# Patient Record
Sex: FEMALE | Race: WHITE | HISPANIC OR LATINO | Employment: PART TIME | URBAN - NONMETROPOLITAN AREA
[De-identification: names, ages, dates, MRNs, and addresses within clinical notes are randomized per-mention and may not be internally consistent; named-entity substitution may affect disease eponyms.]

---

## 2023-10-10 ENCOUNTER — APPOINTMENT (OUTPATIENT)
Dept: PRIMARY CARE | Facility: CLINIC | Age: 28
End: 2023-10-10
Payer: COMMERCIAL

## 2023-10-31 ENCOUNTER — OFFICE VISIT (OUTPATIENT)
Dept: PRIMARY CARE | Facility: CLINIC | Age: 28
End: 2023-10-31
Payer: COMMERCIAL

## 2023-10-31 VITALS
SYSTOLIC BLOOD PRESSURE: 118 MMHG | TEMPERATURE: 97.5 F | DIASTOLIC BLOOD PRESSURE: 72 MMHG | BODY MASS INDEX: 41.55 KG/M2 | HEIGHT: 65 IN | WEIGHT: 249.4 LBS | HEART RATE: 94 BPM | OXYGEN SATURATION: 99 %

## 2023-10-31 DIAGNOSIS — F32.A DEPRESSION, UNSPECIFIED DEPRESSION TYPE: ICD-10-CM

## 2023-10-31 DIAGNOSIS — E66.01 MORBID OBESITY WITH BMI OF 40.0-44.9, ADULT (MULTI): ICD-10-CM

## 2023-10-31 DIAGNOSIS — Z00.00 ANNUAL PHYSICAL EXAM: Primary | ICD-10-CM

## 2023-10-31 PROBLEM — F41.9 ANXIETY AND DEPRESSION: Status: ACTIVE | Noted: 2023-10-31

## 2023-10-31 PROCEDURE — 3008F BODY MASS INDEX DOCD: CPT | Performed by: INTERNAL MEDICINE

## 2023-10-31 PROCEDURE — 99395 PREV VISIT EST AGE 18-39: CPT | Performed by: INTERNAL MEDICINE

## 2023-10-31 PROCEDURE — 1036F TOBACCO NON-USER: CPT | Performed by: INTERNAL MEDICINE

## 2023-10-31 RX ORDER — TRAZODONE HYDROCHLORIDE 100 MG/1
100 TABLET ORAL NIGHTLY
COMMUNITY
End: 2023-11-30 | Stop reason: ALTCHOICE

## 2023-10-31 RX ORDER — SERTRALINE HYDROCHLORIDE 100 MG/1
100 TABLET, FILM COATED ORAL
COMMUNITY
Start: 2023-10-03

## 2023-10-31 RX ORDER — MEDROXYPROGESTERONE ACETATE 150 MG/ML
150 INJECTION, SUSPENSION INTRAMUSCULAR
COMMUNITY
Start: 2023-09-19

## 2023-10-31 RX ORDER — DOXYCYCLINE 100 MG/1
CAPSULE ORAL
COMMUNITY

## 2023-10-31 RX ORDER — HYDROXYZINE PAMOATE 25 MG/1
CAPSULE ORAL
COMMUNITY

## 2023-10-31 ASSESSMENT — ENCOUNTER SYMPTOMS
PALPITATIONS: 0
FATIGUE: 0
SORE THROAT: 0
HEADACHES: 0
DIZZINESS: 0
SINUS PAIN: 0
UNEXPECTED WEIGHT CHANGE: 0
ARTHRALGIAS: 0
DIFFICULTY URINATING: 0
BLOOD IN STOOL: 0
COUGH: 0
DIARRHEA: 0
OCCASIONAL FEELINGS OF UNSTEADINESS: 0
BRUISES/BLEEDS EASILY: 0
LOSS OF SENSATION IN FEET: 0
WHEEZING: 0
DEPRESSION: 1
ABDOMINAL PAIN: 0
FEVER: 0

## 2023-10-31 ASSESSMENT — COLUMBIA-SUICIDE SEVERITY RATING SCALE - C-SSRS
6. HAVE YOU EVER DONE ANYTHING, STARTED TO DO ANYTHING, OR PREPARED TO DO ANYTHING TO END YOUR LIFE?: NO
1. IN THE PAST MONTH, HAVE YOU WISHED YOU WERE DEAD OR WISHED YOU COULD GO TO SLEEP AND NOT WAKE UP?: NO
2. HAVE YOU ACTUALLY HAD ANY THOUGHTS OF KILLING YOURSELF?: YES
4. HAVE YOU HAD THESE THOUGHTS AND HAD SOME INTENTION OF ACTING ON THEM?: NO
5. HAVE YOU STARTED TO WORK OUT OR WORKED OUT THE DETAILS OF HOW TO KILL YOURSELF? DO YOU INTEND TO CARRY OUT THIS PLAN?: NO

## 2023-10-31 NOTE — PROGRESS NOTES
"Subjective   Patient ID: Che Garcia is a 28 y.o. female who presents for Annual Exam, Obesity (Discuss weight loss), and Flu Vaccine (Ineligible in office).    Patient comes today for annual preventive visit also concerned about obesity wants to lose weight  - Vaccinations up-to-date need to proceed with flu vaccine  - Contraception patient using Depo-Provera follow-up GYN  -Chronic anxiety and depression under care of psychiatry on sertraline 100 mg controlled continue with current medication  - Morbid obesity counseled about weight loss low-carb diet which patient using now without improvement  Refer patient to nutritionist  - Need to complete blood work hemoglobin A1c  Reevaluate patient in 4 weeks for further recommendation           Review of Systems   Constitutional:  Negative for fatigue, fever and unexpected weight change.   HENT:  Negative for congestion, ear discharge, ear pain, mouth sores, sinus pain and sore throat.    Eyes:  Negative for visual disturbance.   Respiratory:  Negative for cough and wheezing.    Cardiovascular:  Negative for chest pain, palpitations and leg swelling.   Gastrointestinal:  Negative for abdominal pain, blood in stool and diarrhea.   Genitourinary:  Negative for difficulty urinating.   Musculoskeletal:  Negative for arthralgias.   Skin:  Negative for rash.   Neurological:  Negative for dizziness and headaches.   Hematological:  Does not bruise/bleed easily.   Psychiatric/Behavioral:  Negative for behavioral problems.    All other systems reviewed and are negative.      Objective   No results found for: \"HGBA1C\"   /72   Pulse 94   Temp 36.4 °C (97.5 °F)   Ht 1.638 m (5' 4.5\")   Wt 113 kg (249 lb 6.4 oz)   SpO2 99%   BMI 42.15 kg/m²     Physical Exam  Vitals and nursing note reviewed.   Constitutional:       Appearance: Normal appearance. She is obese.   HENT:      Head: Normocephalic.      Nose: Nose normal.   Eyes:      Conjunctiva/sclera: Conjunctivae " normal.      Pupils: Pupils are equal, round, and reactive to light.   Cardiovascular:      Rate and Rhythm: Regular rhythm.   Pulmonary:      Effort: Pulmonary effort is normal.      Breath sounds: Normal breath sounds.   Abdominal:      General: Abdomen is flat.      Palpations: Abdomen is soft.   Musculoskeletal:      Cervical back: Neck supple.   Skin:     General: Skin is warm.   Neurological:      General: No focal deficit present.      Mental Status: She is oriented to person, place, and time.   Psychiatric:         Mood and Affect: Mood normal.         Assessment/Plan   Che was seen today for annual exam, obesity and flu vaccine.  Diagnoses and all orders for this visit:  Annual physical exam (Primary)  -     CBC and Auto Differential; Future  -     Hemoglobin A1C; Future  -     Comprehensive Metabolic Panel; Future  -     TSH with reflex to Free T4 if abnormal; Future  -     Lipid Panel; Future  Morbid obesity with BMI of 40.0-44.9, adult (CMS/[facility])  -     Referral to Nutrition Services; Future  Depression, unspecified depression type  Other orders  -     Follow Up In Primary Care - Established; Future   Patient comes today for annual preventive visit also concerned about obesity wants to lose weight  - Vaccinations up-to-date need to proceed with flu vaccine  - Contraception patient using Depo-Provera follow-up GYN  -Chronic anxiety and depression under care of psychiatry on sertraline 100 mg controlled continue with current medication  - Morbid obesity counseled about weight loss low-carb diet which patient using now without improvement  Refer patient to nutritionist  - Need to complete blood work hemoglobin A1c  Reevaluate patient in 4 weeks for further recommendation

## 2023-11-03 ENCOUNTER — LAB (OUTPATIENT)
Dept: LAB | Facility: LAB | Age: 28
End: 2023-11-03
Payer: COMMERCIAL

## 2023-11-03 DIAGNOSIS — Z00.00 ANNUAL PHYSICAL EXAM: ICD-10-CM

## 2023-11-03 LAB
ALBUMIN SERPL BCP-MCNC: 4.9 G/DL (ref 3.4–5)
ALP SERPL-CCNC: 62 U/L (ref 33–110)
ALT SERPL W P-5'-P-CCNC: 15 U/L (ref 7–45)
ANION GAP SERPL CALC-SCNC: 14 MMOL/L (ref 10–20)
AST SERPL W P-5'-P-CCNC: 14 U/L (ref 9–39)
BASOPHILS # BLD AUTO: 0.07 X10*3/UL (ref 0–0.1)
BASOPHILS NFR BLD AUTO: 0.8 %
BILIRUB SERPL-MCNC: 0.6 MG/DL (ref 0–1.2)
BUN SERPL-MCNC: 11 MG/DL (ref 6–23)
CALCIUM SERPL-MCNC: 9.4 MG/DL (ref 8.6–10.3)
CHLORIDE SERPL-SCNC: 106 MMOL/L (ref 98–107)
CHOLEST SERPL-MCNC: 173 MG/DL (ref 0–199)
CHOLESTEROL/HDL RATIO: 4.3
CO2 SERPL-SCNC: 24 MMOL/L (ref 21–32)
CREAT SERPL-MCNC: 0.59 MG/DL (ref 0.5–1.05)
EOSINOPHIL # BLD AUTO: 0.21 X10*3/UL (ref 0–0.7)
EOSINOPHIL NFR BLD AUTO: 2.3 %
ERYTHROCYTE [DISTWIDTH] IN BLOOD BY AUTOMATED COUNT: 15.5 % (ref 11.5–14.5)
EST. AVERAGE GLUCOSE BLD GHB EST-MCNC: 108 MG/DL
GFR SERPL CREATININE-BSD FRML MDRD: >90 ML/MIN/1.73M*2
GLUCOSE SERPL-MCNC: 72 MG/DL (ref 74–99)
HBA1C MFR BLD: 5.4 %
HCT VFR BLD AUTO: 41.7 % (ref 36–46)
HDLC SERPL-MCNC: 40.6 MG/DL
HGB BLD-MCNC: 13 G/DL (ref 12–16)
IMM GRANULOCYTES # BLD AUTO: 0.04 X10*3/UL (ref 0–0.7)
IMM GRANULOCYTES NFR BLD AUTO: 0.4 % (ref 0–0.9)
LDLC SERPL CALC-MCNC: 96 MG/DL
LYMPHOCYTES # BLD AUTO: 3.04 X10*3/UL (ref 1.2–4.8)
LYMPHOCYTES NFR BLD AUTO: 33.4 %
MCH RBC QN AUTO: 24 PG (ref 26–34)
MCHC RBC AUTO-ENTMCNC: 31.2 G/DL (ref 32–36)
MCV RBC AUTO: 77 FL (ref 80–100)
MONOCYTES # BLD AUTO: 0.49 X10*3/UL (ref 0.1–1)
MONOCYTES NFR BLD AUTO: 5.4 %
NEUTROPHILS # BLD AUTO: 5.26 X10*3/UL (ref 1.2–7.7)
NEUTROPHILS NFR BLD AUTO: 57.7 %
NON HDL CHOLESTEROL: 132 MG/DL (ref 0–149)
NRBC BLD-RTO: 0 /100 WBCS (ref 0–0)
PLATELET # BLD AUTO: 373 X10*3/UL (ref 150–450)
POTASSIUM SERPL-SCNC: 4 MMOL/L (ref 3.5–5.3)
PROT SERPL-MCNC: 7.3 G/DL (ref 6.4–8.2)
RBC # BLD AUTO: 5.42 X10*6/UL (ref 4–5.2)
SODIUM SERPL-SCNC: 140 MMOL/L (ref 136–145)
TRIGL SERPL-MCNC: 183 MG/DL (ref 0–149)
TSH SERPL-ACNC: 0.73 MIU/L (ref 0.44–3.98)
VLDL: 37 MG/DL (ref 0–40)
WBC # BLD AUTO: 9.1 X10*3/UL (ref 4.4–11.3)

## 2023-11-03 PROCEDURE — 83036 HEMOGLOBIN GLYCOSYLATED A1C: CPT

## 2023-11-03 PROCEDURE — 36415 COLL VENOUS BLD VENIPUNCTURE: CPT

## 2023-11-06 PROBLEM — R42 DIZZINESS: Status: ACTIVE | Noted: 2023-11-06

## 2023-11-06 PROBLEM — G89.29 CHRONIC HEADACHE: Status: ACTIVE | Noted: 2023-11-06

## 2023-11-06 PROBLEM — G43.709 CHRONIC MIGRAINE WITHOUT AURA: Status: ACTIVE | Noted: 2023-11-06

## 2023-11-06 PROBLEM — K21.9 GERD (GASTROESOPHAGEAL REFLUX DISEASE): Status: ACTIVE | Noted: 2023-11-06

## 2023-11-06 PROBLEM — K58.9 IBS (IRRITABLE BOWEL SYNDROME): Status: ACTIVE | Noted: 2023-11-06

## 2023-11-06 PROBLEM — R51.9 CHRONIC HEADACHE: Status: ACTIVE | Noted: 2023-11-06

## 2023-11-06 RX ORDER — TOPIRAMATE 25 MG/1
25 TABLET ORAL 2 TIMES DAILY
COMMUNITY
End: 2023-11-30 | Stop reason: SDUPTHER

## 2023-11-06 RX ORDER — AMITRIPTYLINE HYDROCHLORIDE 25 MG/1
25 TABLET, FILM COATED ORAL NIGHTLY
COMMUNITY
End: 2023-11-30 | Stop reason: ALTCHOICE

## 2023-11-06 RX ORDER — FAMOTIDINE 20 MG/1
20 TABLET, FILM COATED ORAL NIGHTLY
COMMUNITY
End: 2023-11-30 | Stop reason: WASHOUT

## 2023-11-06 RX ORDER — ACETAMINOPHEN 500 MG
5000 TABLET ORAL DAILY
COMMUNITY

## 2023-11-06 RX ORDER — NORGESTIMATE AND ETHINYL ESTRADIOL 7DAYSX3 28
1 KIT ORAL DAILY
COMMUNITY
End: 2023-11-30 | Stop reason: WASHOUT

## 2023-11-06 RX ORDER — SUMATRIPTAN 50 MG/1
50 TABLET, FILM COATED ORAL ONCE
COMMUNITY

## 2023-11-07 ENCOUNTER — NUTRITION (OUTPATIENT)
Dept: NUTRITION | Facility: HOSPITAL | Age: 28
End: 2023-11-07
Payer: COMMERCIAL

## 2023-11-07 VITALS — WEIGHT: 247.4 LBS | HEIGHT: 64 IN | BODY MASS INDEX: 42.24 KG/M2

## 2023-11-07 DIAGNOSIS — E66.01 MORBID OBESITY WITH BMI OF 40.0-44.9, ADULT (MULTI): Primary | ICD-10-CM

## 2023-11-07 PROCEDURE — 97802 MEDICAL NUTRITION INDIV IN: CPT | Performed by: INTERNAL MEDICINE

## 2023-11-07 NOTE — PROGRESS NOTES
"Nutrition Assessment     Reason for Visit:  Che Garcia is a 28 y.o. female who presents for Obesity.    Past Medical History Reviewed.    Medications Reviewed.    Pertinent Labs:  11/3/23:  Glu 72 (L)  MCV 77 (L)  MCHC 31.2 (L)   (H)    Anthropometrics:  Anthropometrics  Height: 163.8 cm (5' 4.49\")  Weight: 112 kg (247 lb 6.4 oz)  BMI (Calculated): 41.83  IBW/kg (Dietitian Calculated): 55.7 kg  Percent of IBW: 201 %    Height:  5' 4.5\"  Weight 11/7/23:  247.4 lbs  BMI 11/7/23:  41.83 (obesity grade III)    Wt Readings from Last 10 Encounters:   11/07/23 112 kg (247 lb 6.4 oz)   10/31/23 113 kg (249 lb 6.4 oz)   03/24/22 114 kg (251 lb 5.2 oz)   03/14/22 113 kg (250 lb)   03/08/22 115 kg (253 lb)   03/08/22 115 kg (253 lb)   03/07/22 114 kg (252 lb)   12/06/21 109 kg (241 lb)   11/16/21 110 kg (243 lb)   11/08/21 112 kg (246 lb 4 oz)          Food And Nutrient Intake:  Food and Nutrient History  Food and Nutrient History: Che is a 28 year old female who states that she presents to nutrition consult for assistance with weight loss, seeks professional help with weight loss journey.  Reports low energy, says her feet hurt more, says she would like to feel and look better.  Endorses concern about potential health issues related to obesity.  She reports successful weight loss of 20 lbs in one month in 2021 by calorie counting, was following a very restrictive 500 calorie per day diet (260 lbs to 234 lbs).  Reports that when she stopped calorie counting, she gained the weight back.  Says she was eating healthier foods but also had cravings and would sometimes eat fast food.  Pt reports history of severe food insecurity followed by overeating when food was available then subsequent weight loss due to recurrent food insecurity.  States that she weighed 300 lbs in 7th grade, 150 lbs in high school.  Pt states that she does not have a good relationship with food; endorses history of emotional eating, binge " eating when feeling more emotional.   Pt states that she joined a gym, athough says she is self-conscious about working out there, has not been for two months.  Pt confirms goal weight of 170 to 180 lbs, although says she would like to weigh 150 lbs if she could.  Reports history of depression and anxiety, works with counselor.  Energy Intake: Good > 75 %  Fluid Intake: Beverages- water, [employer] iced peppermint mocha or Starbucks strawberry drink (pt states that she stopped drinking soda three years ago).  Food Allergy: none per patient  Food Intolerance: none per patient  GI Symptoms: reflux, bloating, abdominal pain, early satiety (Pt reports history of acid reflux, stomach pain (upper and lower abdomen), feeling bloated, history of IBS, has seen GI in the past.)  GI Symptoms greater than 2 weeks: intermittent  Oral Problems: denies  Sleep Duration/Quality: 1-4 hrs continuous (Pt reports sleeping 4 to 5 hours uninterrupted, then wakes up unable to fall back to sleep due to anxiety.)     Food Intake  Meal 1: B- 11/2- large iced peppermint mocha; 11/3- honey bun; 11/4- cinnamon toast crunch cereal; 11/5- cereal  Meal 2: L- 11/2- none; 11/3- ham and cheese sandwich and peppermint latte; ham and cheese sandwich, small bag of chex mix; 11/5- cheddar popcorn  Meal 3: D- 11/2- BBQ pulled pork sandwich and tortilla soup; 11/3- [employer]obster shrimp nellie; 11/4- [employer] meat lovers melt; [place] [employer] pork chop, baked potato, corn  Snacks: small dish of ice cream    Food Preparation  Cooking: Patient  Grocery Shopping: Patient  Dining Out: More than 3 times a week  Other: [employer], [employer]ob[employer], [employer], occasional fast food.                                  Micronutrient Intake  Vitamin Intake: D  Mineral/Element Intake: Calcium                Food And Nutrient Administration:       Diet Experience  Previously prescribed diets: Previous modified diet  Self-selected diet(s) followed: Very Low Calorie (500 kcal  "per day)                Factors:                         Physical Activities:  Physical Activity  Physical Activity History: Pt states that she has a gym membership, although has not been to the gym in 2 months; says she has a treadmill and weights at home, plays exercise video games.           Knowledge Beliefs Attitudes and Behavior                                       Nutrition Focused Physical Exam:  Subcutaneous Fat Loss  Orbital Fat Pads: Defer (NFPE deferred.  Pt with adequately nourished appearance, eating adequately.)        Energy Needs  Calculated Energy Needs Using Equations  Height: 163.8 cm (5' 4.49\")  Weight Used for Equation Calculations: 112 kg (247 lb 6.4 oz)  Loco2 Equation (Overweight or Obese Patients): 1850  Equation Chosen to Use by RD: Rontal Applications  Activity Factor: 1.2  Total Energy Needs: 1720  Estimated Energy Needs  Total Energy Estimated Needs (kCal): 1720 kCal  Method for Estimating Needs: MSJ x AF 1.2 minus 500 calories per day.  Estimated Protein Needs  Total Protein Estimated Needs (g): 90 g  Total Protein Estimated Needs (g/kg): 0.8 g/kg  Method for Estimating Needs: 0.8 g/kg actual weight.        Nutrition Diagnosis      Nutrition Diagnosis  Patient has Nutrition Diagnosis: Yes  Diagnosis Status (1): New  Nutrition Diagnosis 1: Excessive energy intake  Related to (1): food and nutrition related knowledge deficit concerning appropriate energy intake  As Evidenced by (1): weight, BMI 41.83, increased adiposity, diet recall reflecting high intake of energy-dense restaurant foods and sugar sweetened, high caloric beverages and suboptimal level of physical activity.    Nutrition Interventions/Recommendations   Food and Nutrition Delivery  Meals & Snacks: Energy-modified diet, Modify Composition of Meals/Snacks, Modify schedule of foods/fluids, Protein-modified diet, General Healthful Diet, Vitamin-modified diet, Mineral-modified diet  Goals: 1.  Eat 3 meals per day on a " regular schedule plus 1 to 2 small snacks   -->  Use My Plate and 1500 to 1700 calorie meal plans  -->  avoid very low calorie diets    2.  Measure portions of foods with measuring cups and/or food scale    3.  Eat a lean protein food at each meal- ideas discussed  -->  avoid processed meats and fast food    4.   Eat 2 to 3 servings of fruit and 5 servings of non-starchy vegetables each day  -->  Fruit:  1 serving equals 1 small fruit, 3/4 cup berries, 1/2 banana  -->  Vegetables:  1 serving equals 1/2 cooked or 1 cup raw    5.  Limit sugar sweetened beverages  -->  choose low calorie / sugar free strawberry beverage instead of peppermint mocha   6.  Limit intake of restaurant foods  -->  choose lean meats without breading or cream sauce, one serving of starch and one vegetable or two servings of vegetables or salad  -->  divide meal in half and take half of the meal home for the next day   7.  Gender / age appropriate Multi Vitamin with minerals    8.  Find healthy ways to manage stress    9.  Improve sleep hygiene  10.  Physical activity guidelines  -->  150 minutes of moderate intensity physical activity per week  Goals: Daily multivitamin with minerals  Goals: Daily multivitamin with minerals  Nutrition Education  Nutrition Education Content: Content related nutrition education, Physical activity guidance  Goals: Verbal and Printed:   1.  My Plate ([place])   2.  1500 Calorie Five Day Meal Plans (AND)  -->  food models used to show portion sizes and meal ideas  3.  1700 Calorie one day meal plan  4.  Tips for choosing healthy foods when eating at restaurants (SDX)  Nutrition Counseling  Theoretical Basis/Approach: Nutrition counseling based on cognitive behavioral theory approach, Nutrition counseling based on transtheoretical model stages of change approach, Nutrition counseling based on health belief model  Goals: Current Stage of Change:  Preparation  Strategies: Nutrition counseling based on goal setting  strategy  Goals: Pt set goals to work on portioning foods using the Plate Method, to take half of the meal home if eating at a restaurant, to choose a low calorie and low sugar beverage at [employer] instead of high calorie beverage and to increase exercise to 60 minutes five to six days per week using treadmill and exercise video games.  Coordination of Nutrition Care by a Nutrition Professional  Collaboration and referral of nutrition care: Collaboration by nutrition professional with other nutrition professionals  Goals: cc:  referring provider        Patient Instructions   Nutrition Goals and Recommendations:  1.  Eat 3 meals per day on a regular schedule plus 1 to 2 small snacks     -->  Use My Plate and 1500 to 1700 calorie meal plans    -->  avoid very low calorie diets        2.  Measure portions of foods with measuring cups and/or food scale        3.  Eat a lean protein food at each meal- ideas discussed    -->  avoid processed meats and fast food        4.   Eat 2 to 3 servings of fruit and 5 servings of non-starchy vegetables each day    -->  Fruit:  1 serving equals 1 small fruit, 3/4 cup berries, 1/2 banana    -->  Vegetables:  1 serving equals 1/2 cooked or 1 cup raw        5.  Limit sugar sweetened beverages    -->  choose low calorie / sugar free strawberry beverage instead of peppermint mocha       6.  Limit intake of restaurant foods    -->  choose lean meats without breading or cream sauce, one serving of starch and one vegetable or two servings of vegetables or salad    -->  divide meal in half and take half of the meal home for the next day       7.  Gender / age appropriate Multi Vitamin with minerals        8.  Find healthy ways to manage stress        9.  Improve sleep hygiene      10.  Physical activity guidelines  -->  150 minutes of moderate intensity physical activity per week    Nutrition Education  Verbal and Printed:     1.  My Plate ([place])     2.  1500 Calorie Five Day Meal Plans  (AND)    -->  food models used to show portion sizes and meal ideas    3.  1700 Calorie one day meal plan    4.  Tips for choosing healthy foods when eating at restaurants (SDX)    Patient Goals:  Pt set goals to work on portioning foods using the Plate Method, to take half of the meal home if eating at a restaurant, to choose a low calorie and low sugar beverage at [employer] instead of high calorie beverage and to increase exercise to 60 minutes five to six days per week using treadmill and exercise video games.  Coordination of Nutrition Care by a Nutrition Professional  Collaboration and referral of nutrition care: Collaboration by nutrition professional with other nutrition professionals  Goals: cc:  referring provider    Nutrition Monitoring and Evaluation   Food/Nutrient Related History Monitoring  Monitoring and Evaluation Plan: Energy intake, Meal/snack pattern, Protein intake, Vitamin intake, Mineral/element intake, Fluid intake  Energy Intake: Estimated energy intake  Criteria: Pt will use My Plate and 1500 to 1700 calorie meal plans and measure portions of foods with measuring cups and/or food scale and will avoid very low calorie diets; Pt will limit intake of restaurant foods --> choose lean meats without breading or cream sauce, one serving of starch and one vegetable or two servings of vegetables or salad --> divide meal in half and take half of the meal home for the next day  Fluid Intake: Estimated fluid intake  Criteria: Pt will replace sugar sweetened beverages with unsweetened or sugar free beverages.  Meal/Snack Pattern: Estimated meal and snack pattern  Criteria: Pt will eat 3 meals per day on a regular schedule plus 1 to 2 small snacks and eat 2 to 3 servings of fruit and 5 servings of non-starchy vegetables each day --> Fruit: 1 serving equals 1 small fruit, 3/4 cup berries, 1/2 banana --> Vegetables: 1 serving equals 1/2 cooked or 1 cup raw  Estimated protein intake: Estimated protein  intake  Criteria: Pt will eat a lean protein food at each meal (ideas discussed) and avoid processed meats and fast food  Estimated fiber intake: Estimated fiber intake  Criteria: Pt will choose carbohydrate foods high in fiber  Vitamin Intake: Measured vitamin intake  Criteria: Pt will take a daily multi vitamin with minerals  Mineral/Element Intake: Measured mineral/element intake  Criteria: Pt will take a daily multi vitamin with minerals  Additional Plan: Evaluate nutrition intervention as compared to nutrition goal(s) and estimated nutrient need criteria.  Knowledge/Beliefs/Attitudes  Monitoring and Evaluation Plan: Food and nutrition knowledge, Physical activity  Food and nutrition knowledge: Nutrition knowledge of individual client  Criteria: Pt will express good understanding of the nutrition education presented today and Pt will demonstrate efficacy in achieving the goals and recommendations listed above.  Physical activity: Physical activity history  Criteria: Pt will increase physical activity to meet recommended minimum guidelines of 150 minutes of moderate intensity physical activity per week.        Time Spent  Time spent directly with patient, family or caregiver: 60 minutes        Readiness to Change : Good  Level of Understanding : Good  Anticipated Compliant : Good

## 2023-11-08 NOTE — PATIENT INSTRUCTIONS
Nutrition Goals and Recommendations:  1.  Eat 3 meals per day on a regular schedule plus 1 to 2 small snacks     -->  Use My Plate and 1500 to 1700 calorie meal plans    -->  avoid very low calorie diets        2.  Measure portions of foods with measuring cups and/or food scale        3.  Eat a lean protein food at each meal- ideas discussed    -->  avoid processed meats and fast food        4.   Eat 2 to 3 servings of fruit and 5 servings of non-starchy vegetables each day    -->  Fruit:  1 serving equals 1 small fruit, 3/4 cup berries, 1/2 banana    -->  Vegetables:  1 serving equals 1/2 cooked or 1 cup raw        5.  Limit sugar sweetened beverages    -->  choose low calorie / sugar free strawberry beverage instead of peppermint mocha       6.  Limit intake of restaurant foods    -->  choose lean meats without breading or cream sauce, one serving of starch and one vegetable or two servings of vegetables or salad    -->  divide meal in half and take half of the meal home for the next day       7.  Gender / age appropriate Multi Vitamin with minerals        8.  Find healthy ways to manage stress        9.  Improve sleep hygiene      10.  Physical activity guidelines  -->  150 minutes of moderate intensity physical activity per week    Nutrition Education  Verbal and Printed:     1.  My Plate ([place])     2.  1500 Calorie Five Day Meal Plans (AND)    -->  food models used to show portion sizes and meal ideas    3.  1700 Calorie one day meal plan    4.  Tips for choosing healthy foods when eating at restaurants (SDX)    Patient Goals:  Pt set goals to work on portioning foods using the Plate Method, to take half of the meal home if eating at a restaurant, to choose a low calorie and low sugar beverage at [employer] instead of high calorie beverage and to increase exercise to 60 minutes five to six days per week using treadmill and exercise video games.  Coordination of Nutrition Care by a Nutrition  Professional  Collaboration and referral of nutrition care: Collaboration by nutrition professional with other nutrition professionals  Goals: cc:  referring provider

## 2023-11-19 ENCOUNTER — HOSPITAL ENCOUNTER (EMERGENCY)
Facility: HOSPITAL | Age: 28
Discharge: HOME | End: 2023-11-19
Payer: COMMERCIAL

## 2023-11-19 VITALS
TEMPERATURE: 96.7 F | RESPIRATION RATE: 18 BRPM | DIASTOLIC BLOOD PRESSURE: 81 MMHG | WEIGHT: 253.09 LBS | HEIGHT: 64 IN | HEART RATE: 93 BPM | BODY MASS INDEX: 43.21 KG/M2 | OXYGEN SATURATION: 97 % | SYSTOLIC BLOOD PRESSURE: 132 MMHG

## 2023-11-19 DIAGNOSIS — H66.001 ACUTE SUPPURATIVE OTITIS MEDIA OF RIGHT EAR WITHOUT SPONTANEOUS RUPTURE OF TYMPANIC MEMBRANE, RECURRENCE NOT SPECIFIED: ICD-10-CM

## 2023-11-19 DIAGNOSIS — J02.9 ACUTE PHARYNGITIS, UNSPECIFIED ETIOLOGY: Primary | ICD-10-CM

## 2023-11-19 DIAGNOSIS — R05.1 ACUTE COUGH: ICD-10-CM

## 2023-11-19 LAB
FLUAV RNA RESP QL NAA+PROBE: NOT DETECTED
FLUBV RNA RESP QL NAA+PROBE: NOT DETECTED
S PYO DNA THROAT QL NAA+PROBE: NOT DETECTED
SARS-COV-2 RNA RESP QL NAA+PROBE: NOT DETECTED

## 2023-11-19 PROCEDURE — 99283 EMERGENCY DEPT VISIT LOW MDM: CPT | Mod: 25

## 2023-11-19 PROCEDURE — 94760 N-INVAS EAR/PLS OXIMETRY 1: CPT

## 2023-11-19 PROCEDURE — 99285 EMERGENCY DEPT VISIT HI MDM: CPT | Mod: 25

## 2023-11-19 PROCEDURE — 94761 N-INVAS EAR/PLS OXIMETRY MLT: CPT

## 2023-11-19 PROCEDURE — 87651 STREP A DNA AMP PROBE: CPT | Performed by: FAMILY MEDICINE

## 2023-11-19 PROCEDURE — 2500000001 HC RX 250 WO HCPCS SELF ADMINISTERED DRUGS (ALT 637 FOR MEDICARE OP): Mod: SE | Performed by: NURSE PRACTITIONER

## 2023-11-19 PROCEDURE — 87636 SARSCOV2 & INF A&B AMP PRB: CPT | Performed by: FAMILY MEDICINE

## 2023-11-19 RX ORDER — CODEINE PHOSPHATE AND GUAIFENESIN 10; 100 MG/5ML; MG/5ML
10 SOLUTION ORAL ONCE
Status: COMPLETED | OUTPATIENT
Start: 2023-11-19 | End: 2023-11-19

## 2023-11-19 RX ORDER — CODEINE PHOSPHATE AND GUAIFENESIN 10; 100 MG/5ML; MG/5ML
10 SOLUTION ORAL EVERY 6 HOURS PRN
Qty: 200 ML | Refills: 0 | Status: SHIPPED | OUTPATIENT
Start: 2023-11-19 | End: 2023-11-30 | Stop reason: WASHOUT

## 2023-11-19 RX ORDER — LIDOCAINE HYDROCHLORIDE 20 MG/ML
15 SOLUTION OROPHARYNGEAL ONCE
Status: COMPLETED | OUTPATIENT
Start: 2023-11-19 | End: 2023-11-19

## 2023-11-19 RX ORDER — AMOXICILLIN AND CLAVULANATE POTASSIUM 875; 125 MG/1; MG/1
1 TABLET, FILM COATED ORAL EVERY 12 HOURS
Qty: 14 TABLET | Refills: 0 | Status: SHIPPED | OUTPATIENT
Start: 2023-11-19 | End: 2023-11-30 | Stop reason: WASHOUT

## 2023-11-19 RX ORDER — AMOXICILLIN AND CLAVULANATE POTASSIUM 875; 125 MG/1; MG/1
1 TABLET, FILM COATED ORAL ONCE
Status: COMPLETED | OUTPATIENT
Start: 2023-11-19 | End: 2023-11-19

## 2023-11-19 RX ORDER — GLYCERIN 0.25 %
1 DROPS OPHTHALMIC (EYE) 4 TIMES DAILY PRN
Qty: 118 ML | Refills: 0 | Status: SHIPPED | OUTPATIENT
Start: 2023-11-19 | End: 2023-11-30 | Stop reason: WASHOUT

## 2023-11-19 RX ORDER — IBUPROFEN 600 MG/1
600 TABLET ORAL ONCE
Status: COMPLETED | OUTPATIENT
Start: 2023-11-19 | End: 2023-11-19

## 2023-11-19 RX ADMIN — LIDOCAINE HYDROCHLORIDE 15 ML: 20 SOLUTION ORAL; TOPICAL at 22:50

## 2023-11-19 RX ADMIN — IBUPROFEN 600 MG: 600 TABLET ORAL at 22:49

## 2023-11-19 RX ADMIN — GUAIFENESIN AND CODEINE PHOSPHATE 10 ML: 100; 10 SOLUTION ORAL at 22:49

## 2023-11-19 RX ADMIN — AMOXICILLIN AND CLAVULANATE POTASSIUM 875 MG: 875; 125 TABLET, FILM COATED ORAL at 22:49

## 2023-11-19 ASSESSMENT — PAIN DESCRIPTION - LOCATION: LOCATION: THROAT

## 2023-11-19 ASSESSMENT — PAIN - FUNCTIONAL ASSESSMENT: PAIN_FUNCTIONAL_ASSESSMENT: 0-10

## 2023-11-19 ASSESSMENT — COLUMBIA-SUICIDE SEVERITY RATING SCALE - C-SSRS
2. HAVE YOU ACTUALLY HAD ANY THOUGHTS OF KILLING YOURSELF?: NO
1. IN THE PAST MONTH, HAVE YOU WISHED YOU WERE DEAD OR WISHED YOU COULD GO TO SLEEP AND NOT WAKE UP?: NO
6. HAVE YOU EVER DONE ANYTHING, STARTED TO DO ANYTHING, OR PREPARED TO DO ANYTHING TO END YOUR LIFE?: NO

## 2023-11-19 ASSESSMENT — PAIN SCALES - GENERAL: PAINLEVEL_OUTOF10: 7

## 2023-11-19 NOTE — Clinical Note
Che Jamshid Garcia was seen and treated in our emergency department on 11/19/2023.  She may return to work on 11/22/2023.  Back to work Wednesday if feeling better     If you have any questions or concerns, please don't hesitate to call.      Joelle Hooker, APRN-CNP

## 2023-11-19 NOTE — Clinical Note
Che Jamshid Garcia was seen and treated in our emergency department on 11/19/2023.  She may return to work on 11/22/2023.  Back to work Wednesday if feeling better     If you have any questions or concerns, please don't hesitate to call.      Joelel Hooker, APRN-CNP

## 2023-11-20 NOTE — ED PROVIDER NOTES
[facility]  [facility]  ED  Encounter Note  Admit Date/RoomTime: 2023  9:36 PM  ED Room: [facility]  NAME: Che Garcia  : 1995  MRN: [id number]     Chief Complaint:  Sore Throat (Sore throat and cold symptoms for about a month and now bilateral ear pain right is worse than left and feel clogged )    HISTORY OF PRESENT ILLNESS        Che Garcia is a 28 y.o. female who presents to the ED for evaluation of sore throat and right ear pain that started a few days ago. Patient stated that she feels like she has a fever and nothing makes better or worse. She has been using otc meds for comfort. + coughing few days as well.     ROS   Pertinent positives and negatives are stated within HPI, all other systems reviewed and are negative.    Past Medical History:  has no past medical history on file.    Surgical History:  has a past surgical history that includes Other surgical history (2021) and Other surgical history (2021).    Social History:  reports that she has never smoked. She has never used smokeless tobacco. She reports that she does not drink alcohol and does not use drugs.    Family History: family history includes Cancer in her mother.     Allergies: Patient has no known allergies.    PHYSICAL EXAM   Oxygen Saturation Interpretation: Normal.       Physical Exam  Constitutional/General: Alert and oriented x3, well appearing, non toxic  +right ear with bulging tm throat with redness   HEENT:  NC/NT. PERRLA.  Airway patent.  Neck: Supple, full ROM. No midline vertebral tenderness or crepitus.   Respiratory: Lung sounds clear to auscultation bilaterally. No wheezes, rhonchi or stridor. Not in respiratory distress. + cough noted  CV:  Regular rate. Regular rhythm. No murmurs or rubs. 2+ distal pulses.  GI:  Abdomen soft, non-tender, non-distended. +BS. No rebound, guarding, or rigidity. No pulsatile masses.  Musculoskeletal: Moves all extremities x 4. Warm and  well perfused. Capillary refill <3 seconds  Integument: Skin warm and dry. No rashes.   Neurologic: Alert and oriented with no focal deficits, symmetric strength 5/5 in the upper and lower extremities bilaterally.  Psychiatric: Normal affect.    Lab / Imaging Results   (All laboratory and radiology results have been personally reviewed by myself)  Labs:  Results for orders placed or performed during the hospital encounter of 11/19/23   Group A Streptococcus, PCR    Specimen: Throat/Pharynx; Swab   Result Value Ref Range    Group A Strep PCR Not Detected Not Detected   Sars-CoV-2 and Influenza A/B PCR   Result Value Ref Range    Flu A Result Not Detected Not Detected    Flu B Result Not Detected Not Detected    Coronavirus 2019, PCR Not Detected Not Detected     Imaging:  All Radiology results interpreted by Radiologist unless otherwise noted.  No orders to display       ED Course / Medical Decision Making     Medications   lidocaine (Xylocaine) 2 % mouth solution 15 mL (has no administration in time range)   codeine-guaifenesin (Robitussin-AC)  mg/5 mL syrup 10 mL (has no administration in time range)   amoxicillin-pot clavulanate (Augmentin) 875-125 mg per tablet 875 mg (has no administration in time range)   ibuprofen tablet 600 mg (has no administration in time range)     Diagnoses as of 11/19/23 2247   Acute pharyngitis, unspecified etiology   Acute suppurative otitis media of right ear without spontaneous rupture of tympanic membrane, recurrence not specified   Acute cough       Patient’s condition stable.        MDM:   + right otitis media, sore throat, fever, acute cough  Antibiotics, rest, fluids, off work. Followup with doctor if needed  Return if any worsening or new issues      Plan of Care/Counseling:  I reviewed today's visit with the patient  in addition to providing specific details for the plan of care and counseling regarding the diagnosis and prognosis.  Questions are answered at this time  and are agreeable with the plan.    ASSESSMENT     1. Acute pharyngitis, unspecified etiology    2. Acute suppurative otitis media of right ear without spontaneous rupture of tympanic membrane, recurrence not specified    3. Acute cough      PLAN   Discharge home stable     New Medications     New Medications Ordered This Visit   Medications    lidocaine (Xylocaine) 2 % mouth solution 15 mL    codeine-guaifenesin (Robitussin-AC)  mg/5 mL syrup 10 mL    amoxicillin-pot clavulanate (Augmentin) 875-125 mg per tablet 875 mg     Order Specific Question:   Suspected Indication (Select all that apply)     Answer:   Other     Order Specific Question:   Specify     Answer:   ear infection     Order Specific Question:   Type of Therapy     Answer:   Empiric    ibuprofen tablet 600 mg     Order Specific Question:   If ordered PRN for pain, nurse is permitted to administer this medication for higher pain scores based on patient preference?     Answer:   Yes    amoxicillin-pot clavulanate (Augmentin) 875-125 mg tablet     Sig: Take 1 tablet (875 mg) by mouth every 12 hours for 7 days.     Dispense:  14 tablet     Refill:  0    sodium chloride (Ocean) 0.65 % nasal spray     Sig: Administer 1 spray into each nostril if needed for congestion.     Dispense:  30 mL     Refill:  0    codeine-guaifenesin (Robitussin-AC)  mg/5 mL syrup     Sig: Take 10 mL by mouth every 6 hours if needed for cough for up to 7 days.     Dispense:  200 mL     Refill:  0    phenoL-glycerin (Chloraseptic Max Sore Throat) 1.5-33 % spray,non-aerosol     Sig: Place 1 spray into mouth between cheek and gum 4 times a day as needed (sore throat) for up to 7 days.     Dispense:  118 mL     Refill:  0     Electronically signed by Joelle Hooker, APRN-CNP   **This report was transcribed using voice recognition software. Every effort was made to ensure accuracy; however, inadvertent computerized transcription errors may be present.  END OF ED PROVIDER  NOTE     Joelle Hooker, APRN-CNP  11/19/23 7116

## 2023-11-30 ENCOUNTER — OFFICE VISIT (OUTPATIENT)
Dept: PRIMARY CARE | Facility: CLINIC | Age: 28
End: 2023-11-30
Payer: COMMERCIAL

## 2023-11-30 VITALS
BODY MASS INDEX: 43.67 KG/M2 | OXYGEN SATURATION: 99 % | TEMPERATURE: 97.6 F | WEIGHT: 254.4 LBS | HEART RATE: 76 BPM | DIASTOLIC BLOOD PRESSURE: 71 MMHG | SYSTOLIC BLOOD PRESSURE: 120 MMHG

## 2023-11-30 DIAGNOSIS — E66.01 MORBID OBESITY WITH BMI OF 40.0-44.9, ADULT (MULTI): ICD-10-CM

## 2023-11-30 DIAGNOSIS — E88.810 METABOLIC SYNDROME: Primary | ICD-10-CM

## 2023-11-30 DIAGNOSIS — G43.709 CHRONIC MIGRAINE WITHOUT AURA WITHOUT STATUS MIGRAINOSUS, NOT INTRACTABLE: ICD-10-CM

## 2023-11-30 DIAGNOSIS — F32.A DEPRESSION, UNSPECIFIED DEPRESSION TYPE: ICD-10-CM

## 2023-11-30 PROBLEM — R42 DIZZINESS: Status: RESOLVED | Noted: 2023-11-06 | Resolved: 2023-11-30

## 2023-11-30 PROCEDURE — 99214 OFFICE O/P EST MOD 30 MIN: CPT | Performed by: INTERNAL MEDICINE

## 2023-11-30 PROCEDURE — 1036F TOBACCO NON-USER: CPT | Performed by: INTERNAL MEDICINE

## 2023-11-30 PROCEDURE — 3008F BODY MASS INDEX DOCD: CPT | Performed by: INTERNAL MEDICINE

## 2023-11-30 RX ORDER — METFORMIN HYDROCHLORIDE 500 MG/1
500 TABLET ORAL
Qty: 60 TABLET | Refills: 11 | Status: SHIPPED | OUTPATIENT
Start: 2023-11-30 | End: 2024-11-29

## 2023-11-30 RX ORDER — CLINDAMYCIN PHOSPHATE 10 UG/ML
LOTION TOPICAL
COMMUNITY
Start: 2023-11-25

## 2023-11-30 RX ORDER — TOPIRAMATE 50 MG/1
50 TABLET, FILM COATED ORAL 2 TIMES DAILY
Qty: 180 TABLET | Refills: 0 | Status: SHIPPED | OUTPATIENT
Start: 2023-11-30 | End: 2024-02-26

## 2023-11-30 ASSESSMENT — ENCOUNTER SYMPTOMS
HEADACHES: 0
SINUS PAIN: 0
FEVER: 0
BLOOD IN STOOL: 0
DIZZINESS: 0
FATIGUE: 0
ARTHRALGIAS: 0
WHEEZING: 0
BRUISES/BLEEDS EASILY: 0
DEPRESSION: 1
SORE THROAT: 0
DIFFICULTY URINATING: 0
COUGH: 0
UNEXPECTED WEIGHT CHANGE: 0
PALPITATIONS: 0
ABDOMINAL PAIN: 0
DIARRHEA: 0

## 2023-11-30 NOTE — PROGRESS NOTES
Subjective   Patient ID: Che Garcia is a 28 y.o. female who presents for Anxiety, Depression, GERD, and Results (BW).    - Recent blood work reviewed with patient and plan of care discussed counseled about hemoglobin A1c  - Morbid obesity BMI 43.6  I spent >15minutes minutes face to face with individial providing recommendations for nutrition choices and exercise plan to help achieve weight reduction.  Underlying metabolic syndrome patient counseled about diet exercise weight loss  Seen by nutritionist continues 1000-calorie diet  Add metformin 500 mg twice a day  -Underlying history of chronic migraines need to restart again on topiramate titrate slowly over 4 weeks to 50 mg twice a day given new start about titration  -Chronic anxiety and depression under care of psychiatry on sertraline 100 mg controlled continue with current medication hydroxyzine twice a day  -Follow-up results closely reevaluate in 3 months    Anxiety  Patient reports no chest pain, dizziness or palpitations.       DepressionPatient is not experiencing: palpitations.      GERD  She reports no abdominal pain, no chest pain, no coughing, no sore throat or no wheezing. Pertinent negatives include no fatigue.          Review of Systems   Constitutional:  Negative for fatigue, fever and unexpected weight change.   HENT:  Negative for congestion, ear discharge, ear pain, mouth sores, sinus pain and sore throat.    Eyes:  Negative for visual disturbance.   Respiratory:  Negative for cough and wheezing.    Cardiovascular:  Negative for chest pain, palpitations and leg swelling.   Gastrointestinal:  Negative for abdominal pain, blood in stool and diarrhea.   Genitourinary:  Negative for difficulty urinating.   Musculoskeletal:  Negative for arthralgias.   Skin:  Negative for rash.   Neurological:  Negative for dizziness and headaches.   Hematological:  Does not bruise/bleed easily.   Psychiatric/Behavioral:  Positive for depression. Negative  for behavioral problems.    All other systems reviewed and are negative.      Objective   Lab Results   Component Value Date    HGBA1C 5.4 11/03/2023      /71   Pulse 76   Temp 36.4 °C (97.6 °F)   Wt 115 kg (254 lb 6.4 oz)   SpO2 99%   BMI 43.67 kg/m²   Lab Results   Component Value Date    WBC 9.1 11/03/2023    HGB 13.0 11/03/2023    HCT 41.7 11/03/2023     11/03/2023    CHOL 173 11/03/2023    TRIG 183 (H) 11/03/2023    HDL 40.6 11/03/2023    ALT 15 11/03/2023    AST 14 11/03/2023     11/03/2023    K 4.0 11/03/2023     11/03/2023    CREATININE 0.59 11/03/2023    BUN 11 11/03/2023    CO2 24 11/03/2023    TSH 0.73 11/03/2023    HGBA1C 5.4 11/03/2023     par   Physical Exam    Assessment/Plan   Che was seen today for anxiety, depression, gerd and results.  Diagnoses and all orders for this visit:  Metabolic syndrome (Primary)  -     metFORMIN (Glucophage) 500 mg tablet; Take 1 tablet (500 mg) by mouth 2 times a day with meals.  Chronic migraine without aura without status migrainosus, not intractable  -     topiramate (Topamax) 50 mg tablet; Take 1 tablet (50 mg) by mouth 2 times a day.  Depression, unspecified depression type  Morbid obesity with BMI of 40.0-44.9, adult (CMS/[facility])  -     topiramate (Topamax) 50 mg tablet; Take 1 tablet (50 mg) by mouth 2 times a day.  Other orders  -     Follow Up In Primary Care - Established  -     Follow Up In Primary Care - Established; Future   - Recent blood work reviewed with patient and plan of care discussed counseled about hemoglobin A1c  - Morbid obesity BMI 43.6  I spent >15minutes minutes face to face with individial providing recommendations for nutrition choices and exercise plan to help achieve weight reduction.  Underlying metabolic syndrome patient counseled about diet exercise weight loss  Seen by nutritionist continues 1000-calorie diet  Add metformin 500 mg twice a day  -Underlying history of chronic migraines need to restart again  on topiramate titrate slowly over 4 weeks to 50 mg twice a day given new start about titration  -Chronic anxiety and depression under care of psychiatry on sertraline 100 mg controlled continue with current medication hydroxyzine twice a day  -Follow-up results closely reevaluate in 3 months

## 2024-02-25 DIAGNOSIS — E66.01 MORBID OBESITY WITH BMI OF 40.0-44.9, ADULT (MULTI): ICD-10-CM

## 2024-02-25 DIAGNOSIS — G43.709 CHRONIC MIGRAINE WITHOUT AURA WITHOUT STATUS MIGRAINOSUS, NOT INTRACTABLE: ICD-10-CM

## 2024-02-26 RX ORDER — TOPIRAMATE 50 MG/1
50 TABLET, FILM COATED ORAL 2 TIMES DAILY
Qty: 180 TABLET | Refills: 0 | Status: SHIPPED | OUTPATIENT
Start: 2024-02-26 | End: 2024-05-29

## 2024-02-29 ENCOUNTER — APPOINTMENT (OUTPATIENT)
Dept: PRIMARY CARE | Facility: CLINIC | Age: 29
End: 2024-02-29
Payer: COMMERCIAL

## 2024-03-18 ENCOUNTER — APPOINTMENT (OUTPATIENT)
Dept: PRIMARY CARE | Facility: CLINIC | Age: 29
End: 2024-03-18
Payer: COMMERCIAL

## 2024-05-29 DIAGNOSIS — G43.709 CHRONIC MIGRAINE WITHOUT AURA WITHOUT STATUS MIGRAINOSUS, NOT INTRACTABLE: ICD-10-CM

## 2024-05-29 DIAGNOSIS — E66.01 MORBID OBESITY WITH BMI OF 40.0-44.9, ADULT (MULTI): ICD-10-CM

## 2024-05-29 RX ORDER — TOPIRAMATE 50 MG/1
50 TABLET, FILM COATED ORAL 2 TIMES DAILY
Qty: 180 TABLET | Refills: 0 | Status: SHIPPED | OUTPATIENT
Start: 2024-05-29

## 2024-06-19 ENCOUNTER — HOSPITAL ENCOUNTER (EMERGENCY)
Facility: HOSPITAL | Age: 29
Discharge: HOME | End: 2024-06-19
Payer: COMMERCIAL

## 2024-06-19 ENCOUNTER — APPOINTMENT (OUTPATIENT)
Dept: RADIOLOGY | Facility: HOSPITAL | Age: 29
End: 2024-06-19
Payer: COMMERCIAL

## 2024-06-19 ENCOUNTER — APPOINTMENT (OUTPATIENT)
Dept: CARDIOLOGY | Facility: HOSPITAL | Age: 29
End: 2024-06-19
Payer: COMMERCIAL

## 2024-06-19 VITALS
HEART RATE: 107 BPM | SYSTOLIC BLOOD PRESSURE: 139 MMHG | BODY MASS INDEX: 39.37 KG/M2 | WEIGHT: 245 LBS | HEIGHT: 66 IN | DIASTOLIC BLOOD PRESSURE: 85 MMHG | RESPIRATION RATE: 18 BRPM | TEMPERATURE: 98.8 F | OXYGEN SATURATION: 97 %

## 2024-06-19 DIAGNOSIS — R11.0 NAUSEA: ICD-10-CM

## 2024-06-19 DIAGNOSIS — U07.1 COVID-19: Primary | ICD-10-CM

## 2024-06-19 LAB
ALBUMIN SERPL BCP-MCNC: 4.6 G/DL (ref 3.4–5)
ALP SERPL-CCNC: 60 U/L (ref 33–110)
ALT SERPL W P-5'-P-CCNC: 16 U/L (ref 7–45)
ANION GAP SERPL CALC-SCNC: 12 MMOL/L (ref 10–20)
APPEARANCE UR: CLEAR
AST SERPL W P-5'-P-CCNC: 13 U/L (ref 9–39)
BASOPHILS # BLD AUTO: 0.05 X10*3/UL (ref 0–0.1)
BASOPHILS NFR BLD AUTO: 0.7 %
BILIRUB SERPL-MCNC: 0.3 MG/DL (ref 0–1.2)
BILIRUB UR STRIP.AUTO-MCNC: NEGATIVE MG/DL
BUN SERPL-MCNC: 7 MG/DL (ref 6–23)
CALCIUM SERPL-MCNC: 9.3 MG/DL (ref 8.6–10.3)
CARDIAC TROPONIN I PNL SERPL HS: <3 NG/L (ref 0–13)
CHLORIDE SERPL-SCNC: 108 MMOL/L (ref 98–107)
CO2 SERPL-SCNC: 24 MMOL/L (ref 21–32)
COLOR UR: ABNORMAL
CREAT SERPL-MCNC: 0.62 MG/DL (ref 0.5–1.05)
D DIMER PPP FEU-MCNC: 320 NG/ML FEU
EGFRCR SERPLBLD CKD-EPI 2021: >90 ML/MIN/1.73M*2
EOSINOPHIL # BLD AUTO: 0.06 X10*3/UL (ref 0–0.7)
EOSINOPHIL NFR BLD AUTO: 0.9 %
ERYTHROCYTE [DISTWIDTH] IN BLOOD BY AUTOMATED COUNT: 15.3 % (ref 11.5–14.5)
FLUAV RNA RESP QL NAA+PROBE: NOT DETECTED
FLUBV RNA RESP QL NAA+PROBE: NOT DETECTED
GLUCOSE SERPL-MCNC: 94 MG/DL (ref 74–99)
GLUCOSE UR STRIP.AUTO-MCNC: NORMAL MG/DL
HCG UR QL IA.RAPID: NEGATIVE
HCT VFR BLD AUTO: 38.5 % (ref 36–46)
HGB BLD-MCNC: 12.5 G/DL (ref 12–16)
IMM GRANULOCYTES # BLD AUTO: 0.02 X10*3/UL (ref 0–0.7)
IMM GRANULOCYTES NFR BLD AUTO: 0.3 % (ref 0–0.9)
KETONES UR STRIP.AUTO-MCNC: NEGATIVE MG/DL
LEUKOCYTE ESTERASE UR QL STRIP.AUTO: NEGATIVE
LIPASE SERPL-CCNC: 15 U/L (ref 9–82)
LYMPHOCYTES # BLD AUTO: 1.16 X10*3/UL (ref 1.2–4.8)
LYMPHOCYTES NFR BLD AUTO: 17.3 %
MAGNESIUM SERPL-MCNC: 2.25 MG/DL (ref 1.6–2.4)
MCH RBC QN AUTO: 24.1 PG (ref 26–34)
MCHC RBC AUTO-ENTMCNC: 32.5 G/DL (ref 32–36)
MCV RBC AUTO: 74 FL (ref 80–100)
MONOCYTES # BLD AUTO: 0.67 X10*3/UL (ref 0.1–1)
MONOCYTES NFR BLD AUTO: 10 %
NEUTROPHILS # BLD AUTO: 4.76 X10*3/UL (ref 1.2–7.7)
NEUTROPHILS NFR BLD AUTO: 70.8 %
NITRITE UR QL STRIP.AUTO: NEGATIVE
NRBC BLD-RTO: 0 /100 WBCS (ref 0–0)
PH UR STRIP.AUTO: 6.5 [PH]
PLATELET # BLD AUTO: 302 X10*3/UL (ref 150–450)
POTASSIUM SERPL-SCNC: 3.2 MMOL/L (ref 3.5–5.3)
PROT SERPL-MCNC: 7.6 G/DL (ref 6.4–8.2)
PROT UR STRIP.AUTO-MCNC: NEGATIVE MG/DL
RBC # BLD AUTO: 5.18 X10*6/UL (ref 4–5.2)
RBC # UR STRIP.AUTO: ABNORMAL /UL
RBC #/AREA URNS AUTO: NORMAL /HPF
S PYO DNA THROAT QL NAA+PROBE: NOT DETECTED
SARS-COV-2 RNA RESP QL NAA+PROBE: DETECTED
SODIUM SERPL-SCNC: 141 MMOL/L (ref 136–145)
SP GR UR STRIP.AUTO: 1.01
UROBILINOGEN UR STRIP.AUTO-MCNC: NORMAL MG/DL
WBC # BLD AUTO: 6.7 X10*3/UL (ref 4.4–11.3)
WBC #/AREA URNS AUTO: NORMAL /HPF

## 2024-06-19 PROCEDURE — 93005 ELECTROCARDIOGRAM TRACING: CPT

## 2024-06-19 PROCEDURE — 71045 X-RAY EXAM CHEST 1 VIEW: CPT | Performed by: RADIOLOGY

## 2024-06-19 PROCEDURE — 85379 FIBRIN DEGRADATION QUANT: CPT

## 2024-06-19 PROCEDURE — 99284 EMERGENCY DEPT VISIT MOD MDM: CPT

## 2024-06-19 PROCEDURE — 2500000004 HC RX 250 GENERAL PHARMACY W/ HCPCS (ALT 636 FOR OP/ED): Mod: SE

## 2024-06-19 PROCEDURE — 87636 SARSCOV2 & INF A&B AMP PRB: CPT

## 2024-06-19 PROCEDURE — 84484 ASSAY OF TROPONIN QUANT: CPT

## 2024-06-19 PROCEDURE — 36415 COLL VENOUS BLD VENIPUNCTURE: CPT

## 2024-06-19 PROCEDURE — 71045 X-RAY EXAM CHEST 1 VIEW: CPT

## 2024-06-19 PROCEDURE — 85025 COMPLETE CBC W/AUTO DIFF WBC: CPT

## 2024-06-19 PROCEDURE — 2500000002 HC RX 250 W HCPCS SELF ADMINISTERED DRUGS (ALT 637 FOR MEDICARE OP, ALT 636 FOR OP/ED): Mod: SE

## 2024-06-19 PROCEDURE — 83690 ASSAY OF LIPASE: CPT

## 2024-06-19 PROCEDURE — 96375 TX/PRO/DX INJ NEW DRUG ADDON: CPT

## 2024-06-19 PROCEDURE — 87651 STREP A DNA AMP PROBE: CPT

## 2024-06-19 PROCEDURE — 81025 URINE PREGNANCY TEST: CPT

## 2024-06-19 PROCEDURE — 96361 HYDRATE IV INFUSION ADD-ON: CPT

## 2024-06-19 PROCEDURE — 96374 THER/PROPH/DIAG INJ IV PUSH: CPT

## 2024-06-19 PROCEDURE — 81001 URINALYSIS AUTO W/SCOPE: CPT

## 2024-06-19 PROCEDURE — 83735 ASSAY OF MAGNESIUM: CPT

## 2024-06-19 PROCEDURE — 84520 ASSAY OF UREA NITROGEN: CPT

## 2024-06-19 RX ORDER — ONDANSETRON HYDROCHLORIDE 2 MG/ML
4 INJECTION, SOLUTION INTRAVENOUS ONCE
Status: COMPLETED | OUTPATIENT
Start: 2024-06-19 | End: 2024-06-19

## 2024-06-19 RX ORDER — POTASSIUM CHLORIDE 20 MEQ/1
40 TABLET, EXTENDED RELEASE ORAL DAILY
Status: DISCONTINUED | OUTPATIENT
Start: 2024-06-19 | End: 2024-06-19 | Stop reason: HOSPADM

## 2024-06-19 RX ORDER — ONDANSETRON 4 MG/1
4 TABLET, FILM COATED ORAL EVERY 6 HOURS
Qty: 12 TABLET | Refills: 0 | Status: SHIPPED | OUTPATIENT
Start: 2024-06-19 | End: 2024-06-22

## 2024-06-19 RX ORDER — KETOROLAC TROMETHAMINE 30 MG/ML
30 INJECTION, SOLUTION INTRAMUSCULAR; INTRAVENOUS ONCE
Status: COMPLETED | OUTPATIENT
Start: 2024-06-19 | End: 2024-06-19

## 2024-06-19 ASSESSMENT — PAIN - FUNCTIONAL ASSESSMENT
PAIN_FUNCTIONAL_ASSESSMENT: 0-10
PAIN_FUNCTIONAL_ASSESSMENT: 0-10

## 2024-06-19 ASSESSMENT — PAIN SCALES - GENERAL
PAINLEVEL_OUTOF10: 0 - NO PAIN
PAINLEVEL_OUTOF10: 0 - NO PAIN

## 2024-06-19 NOTE — ED PROVIDER NOTES
HPI   Chief Complaint   Patient presents with    Flu Symptoms     Cough , congestion. Sore throat        Patient is a 29-year-old female presenting to the ED with cc of flulike symptoms x 2 days.  Patient endorses productive cough with green-yellow sputum congestion sore throat ear fullness.  Patient denies any contacts with similar symptoms.  Patient states she just flew from [place] today and was on a plane.  Patient is unsure if she picked something up there.  Patient states in March in [place] there was an outbreak of dengue fever and has had mosquito bites but they have mostly healed.  Patient has no new rashes.  Patient denies any history of blood clots or recent surgery.  Patient states she has subjective fevers but is afebrile here.  Patient had Tylenol at 5 AM this morning.  Patient has a birth control shot.  Patient states when she is ambulating she gets short of breath.  Patient denies any chest pain.  Patient states she does feel nauseous but denies any vomiting.  Patient is still tolerating p.o. intake.  Patient has had some diarrhea endorses 3 episodes today.  Patient denies any melena or hematochezia.  Patient denies any abdominal pain dysuria.  Patient denies any tobacco alcohol or street drug abuse.                          No data recorded                   Patient History   History reviewed. No pertinent past medical history.  Past Surgical History:   Procedure Laterality Date    OTHER SURGICAL HISTORY  05/07/2021    Hand surgery    OTHER SURGICAL HISTORY  05/07/2021    [place] tooth extraction     Family History   Problem Relation Name Age of Onset    Cancer Mother       Social History     Tobacco Use    Smoking status: Never    Smokeless tobacco: Never   Vaping Use    Vaping status: Never Used   Substance Use Topics    Alcohol use: Never    Drug use: Never       Physical Exam   ED Triage Vitals [06/19/24 1627]   Temperature Heart Rate Respirations BP   37.1 °C (98.8 °F) (!) 129 16 (!)  149/99      Pulse Ox Temp Source Heart Rate Source Patient Position   98 % Tympanic -- Sitting      BP Location FiO2 (%)     Left arm --       Physical Exam  HENT:      Head: Normocephalic.      Right Ear: Tympanic membrane normal.      Ears:      Comments: L TM erythematous     Mouth/Throat:      Mouth: Mucous membranes are moist.      Pharynx: Posterior oropharyngeal erythema present.   Eyes:      Conjunctiva/sclera: Conjunctivae normal.   Cardiovascular:      Rate and Rhythm: Regular rhythm. Tachycardia present.      Pulses: Normal pulses.   Pulmonary:      Effort: Pulmonary effort is normal.      Breath sounds: Normal breath sounds.   Abdominal:      Palpations: Abdomen is soft.      Tenderness: There is no abdominal tenderness. There is no right CVA tenderness, left CVA tenderness, guarding or rebound.   Musculoskeletal:         General: Normal range of motion.      Cervical back: Normal range of motion.   Skin:     General: Skin is warm.      Capillary Refill: Capillary refill takes less than 2 seconds.      Findings: No rash.   Neurological:      General: No focal deficit present.      Mental Status: She is alert and oriented to person, place, and time. Mental status is at baseline.         ED Course & MDM   ED Course as of 06/19/24 1831 Wed Jun 19, 2024 1737 EKG interpretation performed at 1715 sinus tachycardia normal axis no acute signs of ischemia.  Ventricular rate 105 bpm []      ED Course User Index  [] Jessica Urena PA-C         Diagnoses as of 06/19/24 1831   COVID-19   Nausea       Medical Decision Making  Medical Decision Making:  Patient presented as described in HPI. Patient case including ROS, PE, and treatment and plan discussed with ED attending if attached as cosigner. Due to patients presentation orders completed include as documented.  Patient presents to the ED with cc of flulike symptoms.  Patient just came off a plane from [place] today here today.  Patient has had the  symptoms last couple days.  Patient denies any contacts with similar symptoms.  Patient is nontoxic-appearing no rashes pharynx is erythematous no tonsillar hypertrophy lung sounds are clear bilaterally left TM is erythematous no bulging right TM is unremarkable.  Patient given fluids Zofran and Toradol.  Pending labs and imaging.  COVID is positive potassium is 3.2 patient has history of low potassium.  Patient given potassium replacement.  Rest of labs are unremarkable.  X-ray of the chest is negative.  Patient educated supportive care and fluids.  Patient educated on ibuprofen Tylenol for home.  Patient educated on any worsening symptoms to return.  Patient remained stable and discharged.  Patient was advised to follow up with PCP or recommended provider in 2-3 days for another evaluation and exam. I advised patient/guardian to return or go to closest emergency room immediately if symptoms change, get worse, new symptoms develop prior to follow up. If there is no improvement in symptoms in the next 24 hours they are advised to return for further evaluation and exam. I also explained the plan and treatment course. Patient/guardian is in agreement with plan, treatment course, and follow up and states verbally that they will comply.      Patient care discussed with: N/A  Social Determinants affecting care: N/A    Final diagnosis and disposition as below.  See CI    Homegoing. I discussed the differential; results and discharge plan with the patient and/or family/friend/caregiver if present.  I emphasized the importance of follow-up with the physician I referred them to in the timeframe recommended.  I explained reasons for the patient to return to the Emergency Department. They agreed that if they feel their condition is worsening or if they have any other concern they should call 911 immediately for further assistance. I gave the patient an opportunity to ask all questions they had and answered all of them  accordingly. They understand return precautions and discharge instructions. The patient and/or family/friend/caregiver expressed understanding verbally and that they would comply.       Disposition:  Discharge      This note has been transcribed using voice recognition and may contain grammatical errors, misplaced words, incorrect words, incorrect phrases or other errors.        Labs Reviewed   SARS-COV-2 PCR - Abnormal       Result Value    Coronavirus 2019, PCR Detected (*)     Narrative:     This assay has received FDA Emergency Use Authorization (EUA) and is only authorized for the duration of time that circumstances exist to justify the authorization of the emergency use of in vitro diagnostic tests for the detection of SARS-CoV-2 virus and/or diagnosis of COVID-19 infection under section 564(b)(1) of the Act, 21 U.S.C. 360bbb-3(b)(1). This assay is an in vitro diagnostic nucleic acid amplification test for the qualitative detection of SARS-CoV-2 from nasopharyngeal specimens and has been validated for use at [facility]. Negative results do not preclude COVID-19 infections and should not be used as the sole basis for diagnosis, treatment, or other management decisions.     URINALYSIS WITH REFLEX CULTURE AND MICROSCOPIC - Abnormal    Color, Urine Light-Yellow      Appearance, Urine Clear      Specific Gravity, Urine 1.013      pH, Urine 6.5      Protein, Urine NEGATIVE      Glucose, Urine Normal      Blood, Urine 0.06 (1+) (*)     Ketones, Urine NEGATIVE      Bilirubin, Urine NEGATIVE      Urobilinogen, Urine Normal      Nitrite, Urine NEGATIVE      Leukocyte Esterase, Urine NEGATIVE     CBC WITH AUTO DIFFERENTIAL - Abnormal    WBC 6.7      nRBC 0.0      RBC 5.18      Hemoglobin 12.5      Hematocrit 38.5      MCV 74 (*)     MCH 24.1 (*)     MCHC 32.5      RDW 15.3 (*)     Platelets 302      Neutrophils % 70.8      Immature Granulocytes %, Automated 0.3      Lymphocytes % 17.3       Monocytes % 10.0      Eosinophils % 0.9      Basophils % 0.7      Neutrophils Absolute 4.76      Immature Granulocytes Absolute, Automated 0.02      Lymphocytes Absolute 1.16 (*)     Monocytes Absolute 0.67      Eosinophils Absolute 0.06      Basophils Absolute 0.05     COMPREHENSIVE METABOLIC PANEL - Abnormal    Glucose 94      Sodium 141      Potassium 3.2 (*)     Chloride 108 (*)     Bicarbonate 24      Anion Gap 12      Urea Nitrogen 7      Creatinine 0.62      eGFR >90      Calcium 9.3      Albumin 4.6      Alkaline Phosphatase 60      Total Protein 7.6      AST 13      Bilirubin, Total 0.3      ALT 16     GROUP A STREPTOCOCCUS, PCR - Normal    Group A Strep PCR Not Detected     HCG, URINE, QUALITATIVE - Normal    HCG, Urine NEGATIVE     INFLUENZA A AND B PCR - Normal    Flu A Result Not Detected      Flu B Result Not Detected      Narrative:     This assay is an in vitro diagnostic multiplex nucleic acid amplification test for the detection and discrimination of Influenza A & B from nasopharyngeal specimens, and has been validated for use at [facility]. Negative results do not preclude Influenza A/B infections, and should not be used as the sole basis for diagnosis, treatment, or other management decisions. If Influenza A/B and RSV PCR results are negative, testing for Parainfluenza virus, Adenovirus and Metapneumovirus is routinely performed for [facility] pediatric oncology and intensive care inpatients, and is available on other patients by placing an add-on request.   MAGNESIUM - Normal    Magnesium 2.25     LIPASE - Normal    Lipase 15      Narrative:     Venipuncture immediately after or during the administration of Metamizole may lead to falsely low results. Testing should be performed immediately prior to Metamizole dosing.   TROPONIN I, HIGH SENSITIVITY - Normal    Troponin I, High Sensitivity <3      Narrative:     Less than 99th percentile of normal range cutoff-  Female and  children under 18 years old <14 ng/L; Male <21 ng/L: Negative  Repeat testing should be performed if clinically indicated.     Female and children under 18 years old 14-50 ng/L; Male 21-50 ng/L:  Consistent with possible cardiac damage and possible increased clinical   risk. Serial measurements may help to assess extent of myocardial damage.     >50 ng/L: Consistent with cardiac damage, increased clinical risk and  myocardial infarction. Serial measurements may help assess extent of   myocardial damage.      NOTE: Children less than 1 year old may have higher baseline troponin   levels and results should be interpreted in conjunction with the overall   clinical context.     NOTE: Troponin I testing is performed using a different   testing methodology at [facility] than at other   [facility]. Direct result comparisons should only   be made within the same method.   D-DIMER, VTE EXCLUSION - Normal    D-Dimer, Quantitative VTE Exclusion 320      Narrative:     The VTE Exclusion D-Dimer assay is reported in ng/mL Fibrinogen Equivalent Units (FEU).    Per 's instructions for use, a value of less than 500 ng/mL (FEU) may help to exclude DVT or PE in outpatients when the assay is used with a clinical pretest probability assessment.(AE must utilize and document eCalc 'Wells Score Deep Vein Thrombosis Risk' for DVT exclusion only. Emergency Department should utilize  Guidelines for Emergency Department Use of the VTE Exclusion D-Dimer and Clinical Pretest probability assessment model for DVT or PE exclusion.)   URINALYSIS MICROSCOPIC WITH REFLEX CULTURE - Normal    WBC, Urine 1-5      RBC, Urine 3-5     URINALYSIS WITH REFLEX CULTURE AND MICROSCOPIC    Narrative:     The following orders were created for panel order Urinalysis with Reflex Culture and Microscopic.  Procedure                               Abnormality         Status                     ---------                                -----------         ------                     Urinalysis with Reflex C...[[id number]]  Abnormal            Final result               Extra Urine Gray Tube[[id number]]                                                         Please view results for these tests on the individual orders.   EXTRA URINE GRAY TUBE      XR chest 1 view   Final Result   1.  No evidence of acute cardiopulmonary process.                  MACRO:   None        Signed by: Dm Elmore 6/19/2024 5:28 PM   Dictation workstation:   [id number]         Procedure  Procedures     Jessica Urena PA-C  06/19/24 1835

## 2024-06-23 LAB
ATRIAL RATE: 105 BPM
P AXIS: 57 DEGREES
P OFFSET: 214 MS
P ONSET: 162 MS
PR INTERVAL: 126 MS
Q ONSET: 225 MS
QRS COUNT: 18 BEATS
QRS DURATION: 88 MS
QT INTERVAL: 306 MS
QTC CALCULATION(BAZETT): 404 MS
QTC FREDERICIA: 368 MS
R AXIS: 50 DEGREES
T AXIS: 13 DEGREES
T OFFSET: 378 MS
VENTRICULAR RATE: 105 BPM

## 2024-07-14 ENCOUNTER — APPOINTMENT (OUTPATIENT)
Dept: CARDIOLOGY | Facility: HOSPITAL | Age: 29
End: 2024-07-14
Payer: COMMERCIAL

## 2024-07-14 ENCOUNTER — HOSPITAL ENCOUNTER (EMERGENCY)
Facility: HOSPITAL | Age: 29
Discharge: OTHER INSTITUTION | End: 2024-07-14
Attending: FAMILY MEDICINE
Payer: COMMERCIAL

## 2024-07-14 VITALS
DIASTOLIC BLOOD PRESSURE: 85 MMHG | SYSTOLIC BLOOD PRESSURE: 144 MMHG | WEIGHT: 245 LBS | HEIGHT: 66 IN | RESPIRATION RATE: 18 BRPM | BODY MASS INDEX: 39.37 KG/M2 | HEART RATE: 117 BPM | TEMPERATURE: 97.8 F | OXYGEN SATURATION: 96 %

## 2024-07-14 DIAGNOSIS — F41.9 ANXIETY: ICD-10-CM

## 2024-07-14 DIAGNOSIS — R45.851 DEPRESSION WITH SUICIDAL IDEATION: Primary | ICD-10-CM

## 2024-07-14 DIAGNOSIS — F32.A DEPRESSION WITH SUICIDAL IDEATION: Primary | ICD-10-CM

## 2024-07-14 LAB
ALBUMIN SERPL BCP-MCNC: 4.4 G/DL (ref 3.4–5)
ALP SERPL-CCNC: 56 U/L (ref 33–110)
ALT SERPL W P-5'-P-CCNC: 14 U/L (ref 7–45)
AMPHETAMINES UR QL SCN: NORMAL
ANION GAP SERPL CALC-SCNC: 16 MMOL/L (ref 10–20)
APPEARANCE UR: CLEAR
AST SERPL W P-5'-P-CCNC: 18 U/L (ref 9–39)
BARBITURATES UR QL SCN: NORMAL
BASOPHILS # BLD AUTO: 0.04 X10*3/UL (ref 0–0.1)
BASOPHILS NFR BLD AUTO: 0.4 %
BENZODIAZ UR QL SCN: NORMAL
BILIRUB SERPL-MCNC: 0.3 MG/DL (ref 0–1.2)
BILIRUB UR STRIP.AUTO-MCNC: NEGATIVE MG/DL
BUN SERPL-MCNC: 8 MG/DL (ref 6–23)
BZE UR QL SCN: NORMAL
CALCIUM SERPL-MCNC: 9.4 MG/DL (ref 8.6–10.3)
CANNABINOIDS UR QL SCN: NORMAL
CHLORIDE SERPL-SCNC: 110 MMOL/L (ref 98–107)
CO2 SERPL-SCNC: 20 MMOL/L (ref 21–32)
COLOR UR: COLORLESS
CREAT SERPL-MCNC: 0.56 MG/DL (ref 0.5–1.05)
EGFRCR SERPLBLD CKD-EPI 2021: >90 ML/MIN/1.73M*2
EOSINOPHIL # BLD AUTO: 0.19 X10*3/UL (ref 0–0.7)
EOSINOPHIL NFR BLD AUTO: 2.1 %
ERYTHROCYTE [DISTWIDTH] IN BLOOD BY AUTOMATED COUNT: 16.1 % (ref 11.5–14.5)
ETHANOL SERPL-MCNC: 45 MG/DL
FENTANYL+NORFENTANYL UR QL SCN: NORMAL
GLUCOSE SERPL-MCNC: 108 MG/DL (ref 74–99)
GLUCOSE UR STRIP.AUTO-MCNC: NORMAL MG/DL
HCG UR QL IA.RAPID: NEGATIVE
HCT VFR BLD AUTO: 39 % (ref 36–46)
HGB BLD-MCNC: 12.4 G/DL (ref 12–16)
IMM GRANULOCYTES # BLD AUTO: 0.04 X10*3/UL (ref 0–0.7)
IMM GRANULOCYTES NFR BLD AUTO: 0.4 % (ref 0–0.9)
KETONES UR STRIP.AUTO-MCNC: NEGATIVE MG/DL
LEUKOCYTE ESTERASE UR QL STRIP.AUTO: NEGATIVE
LYMPHOCYTES # BLD AUTO: 2.55 X10*3/UL (ref 1.2–4.8)
LYMPHOCYTES NFR BLD AUTO: 28.4 %
MCH RBC QN AUTO: 24 PG (ref 26–34)
MCHC RBC AUTO-ENTMCNC: 31.8 G/DL (ref 32–36)
MCV RBC AUTO: 75 FL (ref 80–100)
METHADONE UR QL SCN: NORMAL
MONOCYTES # BLD AUTO: 0.5 X10*3/UL (ref 0.1–1)
MONOCYTES NFR BLD AUTO: 5.6 %
NEUTROPHILS # BLD AUTO: 5.67 X10*3/UL (ref 1.2–7.7)
NEUTROPHILS NFR BLD AUTO: 63.1 %
NITRITE UR QL STRIP.AUTO: NEGATIVE
NRBC BLD-RTO: 0 /100 WBCS (ref 0–0)
OPIATES UR QL SCN: NORMAL
OXYCODONE+OXYMORPHONE UR QL SCN: NORMAL
PCP UR QL SCN: NORMAL
PH UR STRIP.AUTO: 5 [PH]
PLATELET # BLD AUTO: 341 X10*3/UL (ref 150–450)
POTASSIUM SERPL-SCNC: 3.9 MMOL/L (ref 3.5–5.3)
PROT SERPL-MCNC: 7.3 G/DL (ref 6.4–8.2)
PROT UR STRIP.AUTO-MCNC: NEGATIVE MG/DL
RBC # BLD AUTO: 5.17 X10*6/UL (ref 4–5.2)
RBC # UR STRIP.AUTO: NEGATIVE /UL
SODIUM SERPL-SCNC: 142 MMOL/L (ref 136–145)
SP GR UR STRIP.AUTO: 1
UROBILINOGEN UR STRIP.AUTO-MCNC: NORMAL MG/DL
WBC # BLD AUTO: 9 X10*3/UL (ref 4.4–11.3)

## 2024-07-14 PROCEDURE — 81003 URINALYSIS AUTO W/O SCOPE: CPT | Performed by: FAMILY MEDICINE

## 2024-07-14 PROCEDURE — 81025 URINE PREGNANCY TEST: CPT | Performed by: FAMILY MEDICINE

## 2024-07-14 PROCEDURE — 80307 DRUG TEST PRSMV CHEM ANLYZR: CPT | Performed by: FAMILY MEDICINE

## 2024-07-14 PROCEDURE — 36415 COLL VENOUS BLD VENIPUNCTURE: CPT | Performed by: FAMILY MEDICINE

## 2024-07-14 PROCEDURE — 82077 ASSAY SPEC XCP UR&BREATH IA: CPT | Performed by: FAMILY MEDICINE

## 2024-07-14 PROCEDURE — 93005 ELECTROCARDIOGRAM TRACING: CPT

## 2024-07-14 PROCEDURE — 85025 COMPLETE CBC W/AUTO DIFF WBC: CPT | Performed by: FAMILY MEDICINE

## 2024-07-14 PROCEDURE — 99285 EMERGENCY DEPT VISIT HI MDM: CPT

## 2024-07-14 PROCEDURE — 80053 COMPREHEN METABOLIC PANEL: CPT | Performed by: FAMILY MEDICINE

## 2024-07-14 SDOH — HEALTH STABILITY: MENTAL HEALTH: BEHAVIORS/MOOD: CALM;COOPERATIVE

## 2024-07-14 SDOH — HEALTH STABILITY: MENTAL HEALTH: SUICIDAL BEHAVIOR (LIFETIME): NO

## 2024-07-14 SDOH — HEALTH STABILITY: MENTAL HEALTH: WISH TO BE DEAD (PAST 1 MONTH): YES

## 2024-07-14 SDOH — HEALTH STABILITY: MENTAL HEALTH: ACTIVE SUICIDAL IDEATION WITH SPECIFIC PLAN AND INTENT (PAST 1 MONTH): NO

## 2024-07-14 SDOH — HEALTH STABILITY: MENTAL HEALTH: ACTIVE SUICIDAL IDEATION WITH SOME INTENT TO ACT, WITHOUT SPECIFIC PLAN (PAST 1 MONTH): YES

## 2024-07-14 SDOH — HEALTH STABILITY: MENTAL HEALTH
DEPRESSION SYMPTOMS: CHANGE IN ENERGY LEVEL;CRYING;FEELINGS OF HELPLESSNESS;FEELINGS OF HOPELESSESS;FEELINGS OF WORTHLESSNESS;IMPAIRED CONCENTRATION;INCREASED IRRITABILITY;ISOLATIVE;LOSS OF INTEREST;SLEEP DISTURBANCE

## 2024-07-14 SDOH — HEALTH STABILITY: MENTAL HEALTH: NON-SPECIFIC ACTIVE SUICIDAL THOUGHTS (PAST 1 MONTH): YES

## 2024-07-14 SDOH — HEALTH STABILITY: MENTAL HEALTH: ANXIETY SYMPTOMS: GENERALIZED;PANIC ATTACK;CHEST PAIN

## 2024-07-14 SDOH — ECONOMIC STABILITY: HOUSING INSECURITY: FEELS SAFE LIVING IN HOME: YES

## 2024-07-14 ASSESSMENT — LIFESTYLE VARIABLES
SUBSTANCE_ABUSE_PAST_12_MONTHS: NO
PRESCIPTION_ABUSE_PAST_12_MONTHS: NO

## 2024-07-14 ASSESSMENT — PAIN - FUNCTIONAL ASSESSMENT: PAIN_FUNCTIONAL_ASSESSMENT: 0-10

## 2024-07-14 ASSESSMENT — COLUMBIA-SUICIDE SEVERITY RATING SCALE - C-SSRS
1. SINCE LAST CONTACT, HAVE YOU WISHED YOU WERE DEAD OR WISHED YOU COULD GO TO SLEEP AND NOT WAKE UP?: YES
6. HAVE YOU EVER DONE ANYTHING, STARTED TO DO ANYTHING, OR PREPARED TO DO ANYTHING TO END YOUR LIFE?: YES
1. IN THE PAST MONTH, HAVE YOU WISHED YOU WERE DEAD OR WISHED YOU COULD GO TO SLEEP AND NOT WAKE UP?: YES
2. HAVE YOU ACTUALLY HAD ANY THOUGHTS OF KILLING YOURSELF?: YES
4. HAVE YOU HAD THESE THOUGHTS AND HAD SOME INTENTION OF ACTING ON THEM?: YES
5. HAVE YOU STARTED TO WORK OUT OR WORKED OUT THE DETAILS OF HOW TO KILL YOURSELF? DO YOU INTEND TO CARRY OUT THIS PLAN?: NO
6. HAVE YOU EVER DONE ANYTHING, STARTED TO DO ANYTHING, OR PREPARED TO DO ANYTHING TO END YOUR LIFE?: YES
6. HAVE YOU EVER DONE ANYTHING, STARTED TO DO ANYTHING, OR PREPARED TO DO ANYTHING TO END YOUR LIFE?: NO
2. HAVE YOU ACTUALLY HAD ANY THOUGHTS OF KILLING YOURSELF?: YES
5. HAVE YOU STARTED TO WORK OUT OR WORKED OUT THE DETAILS OF HOW TO KILL YOURSELF? DO YOU INTEND TO CARRY OUT THIS PLAN?: NO

## 2024-07-14 ASSESSMENT — PAIN SCALES - GENERAL
PAINLEVEL_OUTOF10: 0 - NO PAIN
PAINLEVEL_OUTOF10: 0 - NO PAIN

## 2024-07-14 NOTE — ED TRIAGE NOTES
Pt presents to ED tearful and anxious pt states she does not want to live anymore and has been struggling with aniexty and depression for several months but it has recently gotten unmanageable she states she has no plan but thinks about ending her life daily pt sees a therapist and prescriber and nothing is seeming to help

## 2024-07-14 NOTE — ED PROVIDER NOTES
HPI   Chief Complaint   Patient presents with    Psychiatric Evaluation       29-year-old female with history of anxiety and bipolar comes to the ED with complaint of feeling more depressed and suicidal who wanting to harm her self but stating she is not sure if she actually has a plan.  Patient reports her stressors have continued to increase in her life and she has felt overwhelmed.  Patient reports today she could no longer tolerate it and felt that she was going to harm her self and came to the ED for assistance.  Patient in the ED is alert, cooperative, appears anxious, and withdrawn.  Patient currently denies any physical complaints and has no HI or hallucinations.  Patient also denies any drug/EtOH abuse.      History provided by:  Patient and medical records   used: No                        Willis Coma Scale Score: 15                     Patient History   History reviewed. No pertinent past medical history.  Past Surgical History:   Procedure Laterality Date    OTHER SURGICAL HISTORY  05/07/2021    Hand surgery    OTHER SURGICAL HISTORY  05/07/2021    [place] tooth extraction     Family History   Problem Relation Name Age of Onset    Cancer Mother       Social History     Tobacco Use    Smoking status: Never    Smokeless tobacco: Never   Vaping Use    Vaping status: Never Used   Substance Use Topics    Alcohol use: Never    Drug use: Never       Physical Exam   ED Triage Vitals   Temperature Heart Rate Respirations BP   07/14/24 0033 07/14/24 0053 07/14/24 0053 07/14/24 0053   36.6 °C (97.8 °F) (!) 117 18 144/85      Pulse Ox Temp src Heart Rate Source Patient Position   07/14/24 0053 -- -- --   96 %         BP Location FiO2 (%)     -- --             Physical Exam  Vitals and nursing note reviewed.   Constitutional:       General: She is not in acute distress.     Appearance: She is well-developed.   HENT:      Head: Normocephalic and atraumatic.   Eyes:      Conjunctiva/sclera:  Conjunctivae normal.   Cardiovascular:      Rate and Rhythm: Normal rate and regular rhythm.      Heart sounds: No murmur heard.  Pulmonary:      Effort: Pulmonary effort is normal. No respiratory distress.      Breath sounds: Normal breath sounds.   Abdominal:      Palpations: Abdomen is soft.      Tenderness: There is no abdominal tenderness.   Musculoskeletal:         General: No swelling.      Cervical back: Neck supple.   Skin:     General: Skin is warm and dry.      Capillary Refill: Capillary refill takes less than 2 seconds.   Neurological:      General: No focal deficit present.      Mental Status: She is alert and oriented to person, place, and time.      GCS: GCS eye subscore is 4. GCS verbal subscore is 5. GCS motor subscore is 6.   Psychiatric:         Mood and Affect: Mood normal.         ED Course & MDM   Diagnoses as of 07/14/24 0520   Depression with suicidal ideation   Anxiety     Labs Reviewed   CBC WITH AUTO DIFFERENTIAL - Abnormal       Result Value    WBC 9.0      nRBC 0.0      RBC 5.17      Hemoglobin 12.4      Hematocrit 39.0      MCV 75 (*)     MCH 24.0 (*)     MCHC 31.8 (*)     RDW 16.1 (*)     Platelets 341      Neutrophils % 63.1      Immature Granulocytes %, Automated 0.4      Lymphocytes % 28.4      Monocytes % 5.6      Eosinophils % 2.1      Basophils % 0.4      Neutrophils Absolute 5.67      Immature Granulocytes Absolute, Automated 0.04      Lymphocytes Absolute 2.55      Monocytes Absolute 0.50      Eosinophils Absolute 0.19      Basophils Absolute 0.04     COMPREHENSIVE METABOLIC PANEL - Abnormal    Glucose 108 (*)     Sodium 142      Potassium 3.9      Chloride 110 (*)     Bicarbonate 20 (*)     Anion Gap 16      Urea Nitrogen 8      Creatinine 0.56      eGFR >90      Calcium 9.4      Albumin 4.4      Alkaline Phosphatase 56      Total Protein 7.3      AST 18      Bilirubin, Total 0.3      ALT 14     ALCOHOL - Abnormal    Alcohol 45 (*)    URINALYSIS WITH REFLEX CULTURE AND  MICROSCOPIC - Abnormal    Color, Urine Colorless (*)     Appearance, Urine Clear      Specific Gravity, Urine 1.005      pH, Urine 5.0      Protein, Urine NEGATIVE      Glucose, Urine Normal      Blood, Urine NEGATIVE      Ketones, Urine NEGATIVE      Bilirubin, Urine NEGATIVE      Urobilinogen, Urine Normal      Nitrite, Urine NEGATIVE      Leukocyte Esterase, Urine NEGATIVE     HCG, URINE, QUALITATIVE - Normal    HCG, Urine NEGATIVE     DRUG SCREEN,URINE - Normal    Amphetamine Screen, Urine Presumptive Negative      Barbiturate Screen, Urine Presumptive Negative      Benzodiazepines Screen, Urine Presumptive Negative      Cannabinoid Screen, Urine Presumptive Negative      Cocaine Metabolite Screen, Urine Presumptive Negative      Fentanyl Screen, Urine Presumptive Negative      Opiate Screen, Urine Presumptive Negative      Oxycodone Screen, Urine Presumptive Negative      PCP Screen, Urine Presumptive Negative      Methadone Screen, Urine Presumptive Negative      Narrative:     Drug screen results are presumptive and should not be used to assess   compliance with prescribed medication. Contact the performing [facility] laboratory   to add-on definitive confirmatory testing if clinically indicated.    Toxicology screening results are reported qualitatively. The concentration must   be greater than or equal to the cutoff to be reported as positive. The concentration   at which the screening test can detect an individual drug or metabolite varies.   The absence of expected drug(s) and/or drug metabolite(s) may indicate non-compliance,   inappropriate timing of specimen collection relative to drug administration, poor drug   absorption, diluted/adulterated urine, or limitations of testing. For medical purposes   only; not valid for forensic use.    Interpretive questions should be directed to the laboratory medical directors.   URINALYSIS WITH REFLEX CULTURE AND MICROSCOPIC    Narrative:     The following orders were  created for panel order Urinalysis with Reflex Culture and Microscopic.  Procedure                               Abnormality         Status                     ---------                               -----------         ------                     Urinalysis with Reflex C...[[id number]]  Abnormal            Final result               Extra Urine Gray Tube[[id number]]                                                         Please view results for these tests on the individual orders.   EXTRA URINE GRAY TUBE     0127 -- NSR rate 95.  Normal axis.  Normal intervals.  No ST-T changes or signs of ischemia.  Normal EKG with no findings of STEMI. Unchanged compared to old EKG]  Medical Decision Making  Patient upon arrival to the ED appeared to be anxious and withdrawn exhibiting concern for wanting to harm her self.  Discussed with patient her presenting complaints and clinical findings.  Reviewed with her epic chart and counseled patient on depression with suicide ideations and appropriate approach to management/treatments.  After assessment and evaluation IV line was started, labs sent, EKG reviewed, placed under psychiatric precautions, and EPAT consulted.  After period time patient was reevaluated to have no change in behavior/thought process and patient final results were reviewed discussed and patient was medically cleared for EPAT evaluation.  Patient was evaluate by EPAT at this time they felt patient warranted need for admission to psychiatric facility for continued care.  At this time patient pending final bed approval for acceptance and transfer.  [facility]    Amount and/or Complexity of Data Reviewed  External Data Reviewed: labs, radiology, ECG and notes.  Labs: ordered. Decision-making details documented in ED Course.  Radiology: ordered and independent interpretation performed.  ECG/medicine tests: ordered and independent interpretation performed. Decision-making  details documented in ED Course.    Risk  Decision regarding hospitalization.        Procedure  Procedures     Rick Ann MD  07/14/24 8238

## 2024-07-14 NOTE — PROGRESS NOTES
EPAT - Social Work Psychiatric Assessment    Arrival Details  Mode of Arrival: Ambulatory  Admission Source: Home  Admission Type: Voluntary  EPAT Assessment Start Date: 07/14/24  EPAT Assessment Start Time: 0400  Name of : ALECIA Mcmullen    History of Present Illness  Admission Reason: Suicidal, panic    HPI: Pt, who is a 29 year old female, presents to the [facility] ED with a chief complaint of panic attack and suicidal ideation. Prior to assessment, pt’s provider note, triage note, and community record were reviewed. Tonight, pt began having an uncontrolled panic attack. Pt is unsure of any specific precipitators but noted that she has been quite stressed in recent months. Pt’s boyfriend drove her to the ED. Once at the hospital, pt initially came in barefoot but then immediately walked out. Hospital staff observed pt pacing in the ambulance bay before coming in through triage. She was noted to be tearful. Pt triaged as “high risk” on her [place] risk screening tool. EPAT was consulted for further evaluation. BAL=45 and UDS was negative. For this assessment, pt continues to be quite anxious and restless. She endorses worsening suicidal thoughts today which is a departure from her baseline, according to pt.       Pt has a past psychiatric history of MDD versus bipolar disorder, MADONNA, and PTSD. Pt has no previous psychiatric admissions. She reported that her family struggles with mental health problems so she has always tried not to stay out of the hospital. Pt reported she has family who have completed suicide. Pt denied having any suicide attempts or episodes of self injury. Over the past year, she has been working with [facility] for outpatient psychiatric care and counseling. Pt does not believe she has seen much improvement after starting medications and participating in psychotherapy.       Pt currently resides with her boyfriend. She reported that her family lives next door to her. Pt’s  boyfriend also has a daughter who stays with pt and boyfriend at times. Recently, the mother of the child has been refusing to allow contact with pt and her boyfriend which pt identifies as her primary stressor currently. Pt is not currently working. She has struggled to maintain employment because of anxiety.    SW Readmission Information   Readmission within 30 Days: No    Psychiatric Symptoms  Anxiety Symptoms: Generalized, Panic attack, Chest pain  Depression Symptoms: Change in energy level, Crying, Feelings of helplessness, Feelings of hopelessess, Feelings of worthlessness, Impaired concentration, Increased irritability, Isolative, Loss of interest, Sleep disturbance  Alicia Symptoms: No problems reported or observed.    Psychosis Symptoms  Hallucination Type: No problems reported or observed.  Delusion Type: No problems reported or observed.    Additional Symptoms - Adult  Generalized Anxiety Disorder: Difficult to control worry, Excessive anxiety/worry, Restlessness, Sleep disturbance  Obsessive Compulsive Disorder: No problems reported or observed.  Panic Attack: Shortness of breath  Post Traumatic Stress Disorder: Traumatic event, Re-living event  Delirium: No problems reported or observed.    Past Psychiatric History/Meds/Treatments  Past Psychiatric History: No previous admissions  Past Psychiatric Meds/Treatments: Refer to med rec  Past Violence/Victimization History: None    Current Mental Health Contacts   Name/Phone Number: Therapist at [facility]   Last Appointment Date: -  Provider Name/Phone Number: [facility]  Provider Last Appointment Date: 06/21/24    Support System: Immediate family, Extended family, Friends    Living Arrangement: House    Home Safety  Feels Safe Living in Home: Yes    Income Information  Employment Status for: Patient  Employment Status: Unemployed  Income Source: Unemployed  Employment/ Finance Comments: Multiple jobs that she is laid off  from    [facility] [employer] [facility]  Current or Previous  Service: None  Education Level:  (Some college)    Social/Cultural History  Social History: Pt is a 29 year old [place] female, lives with boyfriend.  Cultural Requests During Hospitalization: None  Spiritual Requests During Hospitalization: none  Important Activities:  (Anhedonic)    Legal  Legal Considerations: Patient/ Family Ability to Make Healthcare Decisions  Assistance with Managing/Advocating Healthcare Needs:  (Self)  Criminal Activity/ Legal Involvement Pertinent to Current Situation/ Hospitalization: None  Legal Concerns: None    Drug Screening  Have you used any substances (canabis, cocaine, heroin, hallucinogens, inhalants, etc.) in the past 12 months?: No  Have you used any prescription drugs other than prescribed in the past 12 months?: No  Is a toxicology screen needed?: Yes              Orientation  Orientation Level: Oriented X4    General Appearance  Motor Activity: Restlessness  Speech Pattern: Excessively soft  General Attitude: Cooperative, Interested  Appearance/Hygiene: Disheveled    Thought Process  Coherency:  (Organized)  Content: Blaming self  Delusions:  (None)  Perception: Not altered  Hallucination: None  Judgment/Insight: Poor  Confusion: None  Cognition: Poor judgement    Sleep Pattern  Sleep Pattern: Insomnia    Risk Factors  Self Harm/Suicidal Ideation Plan: None  Previous Self Harm/Suicidal Plans: None  Risk Factors: Major mental illness    Violence Risk Assessment  Assessment of Violence: None noted  Thoughts of Harm to Others: No    Ability to Assess Risk Screen  Risk Screen - Ability to Assess: Able to be screened  [place] Suicide Severity Rating Scale (Screener/Recent Self-Report)  1. Wish to be Dead (Past 1 Month): Yes  2. Non-Specific Active Suicidal Thoughts (Past 1 Month): Yes  3. Active Suicidal Ideation with any Methods (Not Plan) Without Intent to Act (Past 1 Month): Yes  4. Active Suicidal  Ideation with Some Intent to Act, Without Specific Plan (Past 1 Month): Yes  5. Active Suicidal Ideation with Specific Plan and Intent (Past 1 Month): No  6. Suicidal Behavior (Lifetime): No  Calculated C-SSRS Risk Score (Lifetime/Recent): High Risk  Step 1: Risk Factors  Current & Past Psychiatric Dx: Mood disorder, PTSD  Presenting Symptoms: Anhedonia, Impulsivity, Hopelessness or despair, Anxiety and/or panic, Insomia  Family History: Suicide, Suicidal behavior, Axis I psychiatric diagnosis requiring hospitalization  Precipitants/Stressors: Triggering events leading to humiliation, shame, and/or despair (e.g. loss of relationship, financial or health status) (real or anticipated), Perceived burden on others  Change in Treatment: Hopeless or dissatisfied with provider or treatment  Access to Lethal Methods : No  Step 2: Protective Factors   Protective Factors Internal: Identifies reasons for living, Fear of death or the actual act of killing self  Protective Factors External: Cultural, spiritual and/or moral attitudes against suicide, Supportive social network or family or friends, Responsibility to children  Step 3: Suicidal Ideation Intensity  How Many Times Have You Had These Thoughts: Daily or almost daily  When You Have the Thoughts How Long do They Last : 4-8 hours/most of the day  Could/Can You Stop Thinking About Killing Yourself or Wanting to Die if You Want to: Unable to control thoughts  Are There Things - Anyone or Anything - That Stopped You From Wanting to Die or Acting on: Deterrents probably stopped you  What Sort of Reasons Did You Have For Thinking About Wanting to Die or Killing Yourself: Completely to end or stop the pain (you couldn't go on living with the pain or how you were feeling)  Total Score: 20  Step 5: Documentation  Risk Level: Moderate suicide risk (Downgraded to moderate risk. No active plans for suicide)    Psychiatric Impression and Plan of Care    Assessment and Plan: Pt is a 29  year old female presenting for psychiatric evaluation with a chief complaint of panic attack and suicidal ideation. Assessment conducted via telehealth. On assessment, pt reported that she’s “been better.” She is visibly anxious, rocking, and reporting that she feels unwell because of her anxiety. Pt reported that tonight she was increasingly feeling like “I’m a burden to my family. I feel helpless.” Pt stated that she was having “thoughts of not wanting to be here.” When asked what she meant, pt reported that she was having suicidal thoughts. Pt described chronic thoughts of suicide over the past year but typically pt feels that she has good control over the thoughts. Tonight, pt felt the thoughts were different; “out of my control. I did not feel safe.” Pt does not believe she would have actually gone through with suicide but the thoughts to kill herself were such that she sought help tonight. IN the midst of the interview, pt began crying and reporting that it was pointless for her to be in the ED tonight; “I already know. You guys can’t help me. No one can help me. There’s no point in any of this. I’m unfixable.” Pt reported that her sleep has been poor and marred by frequent nightmares of past trauma. She denied AH/VH/HI.         Dx: MDD     Panic attack        Plan: Pt is recommended for psychiatric hospitalization because she poses an elevated risk of harm to herself.    Specific Resources Provided to Patient: Inpatient  CM Notified: -  PHP/IOP Recommended: -  Specific Information Provided for PHP/IOP: -  Plan Comments: -    Outcome/Disposition  Patient's Perception of Outcome Achieved: Agreeable- asking for help  Assessment, Recommendations and Risk Level Reviewed with: Dr. Ann  Contact Name: -  Contact Number(s): -  Contact Relationship: -  EPAT Assessment Completed Date: 07/14/24  EPAT Assessment Completed Time: 0114

## 2024-07-14 NOTE — PROGRESS NOTES
Emergency Medicine Transition of Care Note.    I received Che Garcia in signout from Dr. Ann.  Please see the previous ED provider note for all HPI, PE and MDM up to the time of signout at 0700. This is in addition to the primary record.    In brief Che Garcia is an 29 y.o. female presenting for suicidal ideation  Chief Complaint   Patient presents with    Psychiatric Evaluation     At the time of signout we were awaiting: acceptance for transfer at inpatient psychiatric facility    Diagnoses as of 07/14/24 0849   Depression with suicidal ideation   Anxiety       Medical Decision Making  Patient presented to the ED for suicidal ideation.  She was evaluated and medically cleared by the previous physician.  She was seen by EPAT, and was signed out to me awaiting acceptance for transfer at an inpatient psych facility.    During my shift, patient was accepted for transfer at [facility] under the care of Dr. Ernst for further inpatient psychiatric management.  Patient is transferred in stable condition.    Final diagnoses:   [F32.A, R45.851] Depression with suicidal ideation   [F41.9] Anxiety       Procedure  Procedures    Roselia Ferrer MD

## 2024-07-14 NOTE — ED NOTES
[facility] sunrise unit  DR Ernst  [phone]  [facility] [address]     Merissa Quezada  07/14/24 0620

## 2024-07-14 NOTE — SIGNIFICANT EVENT
Application for Emergency Admission      Ready for Transfer?  Is the patient medically cleared for transfer to inpatient psychiatry: Yes  Has the patient been accepted to an inpatient psychiatric hospital: Yes    Application for Emergency Admission  IN ACCORDANCE WITH [place]122[place]  The Chief Clinical Officer of: [place] 7/14/2024 .5:59 AM    Reason for Hospitalization  The undersigned has reason to believe that: Che Garcia Is a mentally ill person subject to hospitalization by court order under division B Section 5122.01 of the Revised Code, i.e., this person:    1.Yes  Represents a substantial risk of physical harm to self as manifested by evidence of threats of, or attempts at, suicide or serious self-inflicted bodily harm    2.No Represents a substantial risk of physical harm to others as manifested by evidence of recent homicidal or other violent behavior, evidence of recent threats that place another in reasonable fear of violent behavior and serious physical harm, or other evidence of present dangerousness    3.No Represents a substantial and immediate risk of serious physical impairment or injury to self as manifested by  evidence that the person is unable to provide for and is not providing for the person's basic physical needs because of the person's mental illness and that appropriate provision for those needs cannot be made  immediately available in the community    4.Yes Would benefit from treatment in a hospital for his mental illness and is in need of such treatment as manifested by evidence of behavior that creates a grave and imminent risk to substantial rights of others or  himself.    5.Yes Would benefit from treatment as manifested by evidence of behavior that indicates all of the following:       (a) The person is unlikely to survive safely in the community without supervision, based on a clinical determination.       (b) The person has a history of lack of compliance  with treatment for mental illness and one of the following applies:      (i) At least twice within the thirty-six months prior to the filing of an affidavit seeking court-ordered treatment of the person under section 5122.111 of the Revised Code, the lack of compliance has been a significant factor in necessitating hospitalization in a hospital or receipt of services in a forensic or other mental health unit of a correctional facility, provided that the thirty-six-month period shall be extended by the length of any hospitalization or incarceration of the person that occurred within the thirty-six-month period.      (ii) Within the forty-eight months prior to the filing of an affidavit seeking court-ordered treatment of the person under section 5122.111 of the Revised Code, the lack of compliance resulted in one or more acts of serious violent behavior toward self or others or threats of, or attempts at, serious physical harm to self or others, provided that the forty-eight-month period shall be extended by the length of any hospitalization or incarceration of the person that occurred within the forty-eight-month period.      (c) The person, as a result of mental illness, is unlikely to voluntarily participate in necessary treatment.       (d) In view of the person's treatment history and current behavior, the person is in need of treatment in order to prevent a relapse or deterioration that would be likely to result in substantial risk of serious harm to the person or others.    (e) Represents a substantial risk of physical harm to self or others if allowed to remain at liberty pending examination.    Therefore, it is requested that said person be admitted to the above named facility.    STATEMENT OF BELIEF    Must be filled out by one of the following: a psychiatrist, licensed physician, licensed clinical psychologist, health or ,  or .  (Statement shall include the circumstances  under which the individual was taken into custody and the reason for the person's belief that hospitalization is necessary. The statement shall also include a reference to efforts made to secure the individual's property at his residence if he was taken into custody there. Every reasonable and appropriate effort should be made to take this person into custody in the least conspicuous manner possible.)    Patient continues to be a threat to herself with no change in behavior/thought process presenting with persistent worsening major depression.  Patient at this time has been evaluated and found to be appropriate for admission for inpatient services and would benefit from further care under psychiatry.    Rick Ann MD 7/14/2024     _____________________________________________________________   Place of Employment: [facility]    STATEMENT OF OBSERVATION BY PSYCHIATRIST, LICENSED PHYSICIAN, OR LICENSED CLINICAL PSYCHOLOGIST, IF APPLICABLE    Place of Observation (e.g., [facility] mental health center, general hospital, office, emergency facility)  (If applicable, please complete)    Rick Ann MD 7/14/2024    _____________________________________________________________

## 2024-07-15 LAB
ATRIAL RATE: 95 BPM
P AXIS: 49 DEGREES
P OFFSET: 210 MS
P ONSET: 157 MS
PR INTERVAL: 122 MS
Q ONSET: 218 MS
QRS COUNT: 15 BEATS
QRS DURATION: 92 MS
QT INTERVAL: 338 MS
QTC CALCULATION(BAZETT): 424 MS
QTC FREDERICIA: 393 MS
R AXIS: 64 DEGREES
T AXIS: 11 DEGREES
T OFFSET: 387 MS
VENTRICULAR RATE: 95 BPM

## 2025-07-31 ENCOUNTER — OFFICE VISIT (OUTPATIENT)
Dept: ORTHOPEDIC SURGERY | Facility: CLINIC | Age: 30
End: 2025-07-31
Payer: COMMERCIAL

## 2025-07-31 ENCOUNTER — HOSPITAL ENCOUNTER (OUTPATIENT)
Dept: RADIOLOGY | Facility: CLINIC | Age: 30
Discharge: HOME | End: 2025-07-31
Payer: COMMERCIAL

## 2025-07-31 VITALS — WEIGHT: 245 LBS | BODY MASS INDEX: 39.37 KG/M2 | HEIGHT: 66 IN

## 2025-07-31 DIAGNOSIS — M79.642 BILATERAL HAND PAIN: ICD-10-CM

## 2025-07-31 DIAGNOSIS — M79.641 BILATERAL HAND PAIN: ICD-10-CM

## 2025-07-31 DIAGNOSIS — R22.32 MASS OF LEFT WRIST: ICD-10-CM

## 2025-07-31 PROCEDURE — 73110 X-RAY EXAM OF WRIST: CPT | Mod: LT

## 2025-07-31 PROCEDURE — 99203 OFFICE O/P NEW LOW 30 MIN: CPT | Performed by: ORTHOPAEDIC SURGERY

## 2025-07-31 PROCEDURE — 1036F TOBACCO NON-USER: CPT | Performed by: ORTHOPAEDIC SURGERY

## 2025-07-31 PROCEDURE — 73130 X-RAY EXAM OF HAND: CPT | Mod: 50

## 2025-07-31 PROCEDURE — 73130 X-RAY EXAM OF HAND: CPT | Mod: BILATERAL PROCEDURE | Performed by: RADIOLOGY

## 2025-07-31 PROCEDURE — 3008F BODY MASS INDEX DOCD: CPT | Performed by: ORTHOPAEDIC SURGERY

## 2025-07-31 PROCEDURE — 73110 X-RAY EXAM OF WRIST: CPT | Mod: BILATERAL PROCEDURE | Performed by: RADIOLOGY

## 2025-07-31 RX ORDER — BUPROPION HYDROCHLORIDE 300 MG/1
300 TABLET ORAL
COMMUNITY
Start: 2025-02-18

## 2025-07-31 RX ORDER — LURASIDONE HYDROCHLORIDE 60 MG/1
60 TABLET, FILM COATED ORAL
COMMUNITY
Start: 2024-12-24

## 2025-07-31 ASSESSMENT — PAIN SCALES - GENERAL: PAINLEVEL_OUTOF10: 6

## 2025-07-31 ASSESSMENT — PAIN - FUNCTIONAL ASSESSMENT: PAIN_FUNCTIONAL_ASSESSMENT: 0-10

## 2025-08-01 NOTE — PROGRESS NOTES
History of Present Illness:  Chief Complaint   Patient presents with    Left Hand - Pain    Right Hand - Pain    Left Wrist - Mass       Patient presents today for evaluation of bilateral hand/wrist pain that has been ongoing for many years.  She recalls having some degree of pain into her hands and wrists since she was about 17 years old.  She was having numbness and tingling concurrent with this pain.  She underwent carpal tunnel release surgery about 5 to 6 years ago and has had relief of the numbness, but still with pain into her hands and wrists.  She more recently noticed a mass about the volar/radial aspect of her left wrist.  She first noticed that about 3 months ago, but it seems to have significantly decreased in size and is minimally symptomatic at this time.  No numbness or tingling.  Pain is worse with increased activities.  She specifically mentions inability to use  and doing repetitive tasks secondary to her pain.    Medical History[1]    Medication Documentation Review Audit       Reviewed by Dinah Nguyen MA (Medical Assistant) on 07/31/25 at 1412      Medication Order Taking? Sig Documenting Provider Last Dose Status   cholecalciferol (Vitamin D-3) 5,000 Units tablet [id number]  Take 1 tablet (5,000 Units) by mouth once daily. Historical Provider, MD  Active   clindamycin (Cleocin T) 1 % lotion [id number]  APPLY TO ENTIRE FACE AND BACK ONCE IN THE MORNING AFTER WASHING Historical Provider, MD  Active   doxycycline (Vibramycin) 100 mg capsule [id number]  TAKE ONE CAPSULE ONCE A DAY WITH FULL MEAL, AVOID LAYING DOWN FOR 30 MINUTES Historical Provider, MD  Active   hydrOXYzine pamoate (Vistaril) 25 mg capsule [id number]  TAKE 1 CAPSULE BY MOUTH 4 (FOUR) TIMES DAILY AS NEEDED FOR ANXIETY Historical Provider, MD  Active   medroxyPROGESTERone 150 mg/mL injection syringe [id number]  Inject 1 mL (150 mg) into the muscle every 3 months. Historical Provider, MD  Active   metFORMIN (Glucophage) 500  mg tablet [id number]  Take 1 tablet (500 mg) by mouth 2 times a day with meals. Carlyn Antoine MD   24 [id number]   sertraline (Zoloft) 100 mg tablet [id number]  Take 1 tablet (100 mg) by mouth once daily. Historical Provider, MD  Active   SUMAtriptan (Imitrex) 50 mg tablet [id number]  Take 1 tablet (50 mg) by mouth 1 time. May repeat dose once in 2 hours if no relief.  Do not exceed 2 doses in 24 hours. Historical Provider, MD  Active   topiramate (Topamax) 50 mg tablet [id number]  TAKE 1 TABLET BY MOUTH TWICE A DAY Carlyn Antoine MD  Active                    RX Allergies[2]    Social History     Socioeconomic History    Marital status: Single     Spouse name: Not on file    Number of children: Not on file    Years of education: Not on file    Highest education level: Not on file   Occupational History    Not on file   Tobacco Use    Smoking status: Never    Smokeless tobacco: Never   Vaping Use    Vaping status: Never Used   Substance and Sexual Activity    Alcohol use: Never    Drug use: Never    Sexual activity: Defer   Other Topics Concern    Not on file   Social History Narrative    Not on file     Social Drivers of Health     Financial Resource Strain: Not on File (2023)    Received from [facility]    Financial Resource Strain     Financial Resource Strain: 0   Food Insecurity: Not on File (2024)    Received from [facility]    Food Insecurity     Food: 0   Transportation Needs: Not on File (2023)    Received from [facility]    Transportation Needs     Transportation: 0   Physical Activity: Not on File (2023)    Received from [facility]    Physical Activity     Physical Activity: 0   Stress: Not on File (2023)    Received from [facility]    Stress     Stress: 0   Recent Concern: Stress - At Risk (2023)    Received from [facility]    Stress     Stress: 2   Social Connections: Not on File (2023)    Received from [facility]    Social Connections     Connectedness: 0   Intimate Partner Violence: Not on  file   Housing Stability: Not on File (2023)    Received from [facility]     [place]       Surgical History[3]     Review of Systems   GENERAL: Negative for malaise, significant weight loss, fever  MUSCULOSKELETAL: see HPI  NEURO: See HPI     Physical Examination  Musculoskeletal:  Bilateral wrists: 70/70 degrees flexion/extension.  Negative RODERICK/DRUJ/TFCC.  Instability with scaphoid shift, but no pain.  Sensation intact distally.  Negative Tinel's at level of carpal tunnel.  With Durkan's maneuver she does report some aching pains into her hands, but no numbness or tingling.  5/5 thumb abduction/finger abduction.  No thenar or intrinsic atrophy.  2+ radial pulse.  Capillary refill less than 2 seconds.  No tenderness about A1 pulleys.  0 cm DPC.    Left wrist with palpable mass about the radial aspect of the wrist.  Measures approximately 5 x 10 mm.  Slightly firm and mobile.  Transilluminates.  Nonpulsatile.    Radiographs: Bilateral hand and wrist radiographs ordered and available for my review/interpretation: No fracture/dislocation.  Joint spaces maintained.     Assessment:  Patient with bilateral chronic wrist pain with some evidence of ligamentous laxity     Plan:  We discussed likely etiology of her symptoms as well as risks and benefits of various treatment options.  Would not recommend any surgical intervention.  We discussed role of anti-inflammatories as well as purposeful movements.  Referral to hand therapy has also been placed for development of home exercise program to stress wrist stabilization/strengthening.    Left wrist mass: Exam and history most consistent with ganglion cyst.  We discussed risks and benefits of various treatment options.  As mass has been strengthening in size and is minimally symptomatic she prefers to continue with observation at this time.    She will follow-up if persistent issues/concerns.  Questions addressed.                [1] History reviewed. No  pertinent past medical history.  [2] No Known Allergies  [3]   Past Surgical History:  Procedure Laterality Date    OTHER SURGICAL HISTORY  05/07/2021    Hand surgery    OTHER SURGICAL HISTORY  05/07/2021    [place] tooth extraction

## 2025-08-11 ENCOUNTER — APPOINTMENT (OUTPATIENT)
Dept: CARDIOLOGY | Facility: HOSPITAL | Age: 30
End: 2025-08-11
Payer: COMMERCIAL

## 2025-08-11 ENCOUNTER — HOSPITAL ENCOUNTER (EMERGENCY)
Facility: HOSPITAL | Age: 30
Discharge: HOME | End: 2025-08-11
Payer: COMMERCIAL

## 2025-08-11 VITALS
BODY MASS INDEX: 40.18 KG/M2 | HEIGHT: 66 IN | WEIGHT: 250 LBS | OXYGEN SATURATION: 96 % | RESPIRATION RATE: 16 BRPM | SYSTOLIC BLOOD PRESSURE: 121 MMHG | HEART RATE: 83 BPM | TEMPERATURE: 98.9 F | DIASTOLIC BLOOD PRESSURE: 65 MMHG

## 2025-08-11 DIAGNOSIS — Z51.89 VISIT FOR WOUND CHECK: Primary | ICD-10-CM

## 2025-08-11 LAB
ALBUMIN SERPL BCP-MCNC: 4.3 G/DL (ref 3.4–5)
ALP SERPL-CCNC: 82 U/L (ref 33–110)
ALT SERPL W P-5'-P-CCNC: 12 U/L (ref 7–45)
ANION GAP SERPL CALC-SCNC: 15 MMOL/L (ref 10–20)
AST SERPL W P-5'-P-CCNC: 11 U/L (ref 9–39)
BASOPHILS # BLD AUTO: 0.07 X10*3/UL (ref 0–0.1)
BASOPHILS NFR BLD AUTO: 0.5 %
BILIRUB SERPL-MCNC: 0.3 MG/DL (ref 0–1.2)
BUN SERPL-MCNC: 7 MG/DL (ref 6–23)
CALCIUM SERPL-MCNC: 9.2 MG/DL (ref 8.6–10.3)
CARDIAC TROPONIN I PNL SERPL HS: <3 NG/L (ref 0–13)
CHLORIDE SERPL-SCNC: 105 MMOL/L (ref 98–107)
CO2 SERPL-SCNC: 24 MMOL/L (ref 21–32)
CREAT SERPL-MCNC: 0.7 MG/DL (ref 0.5–1.05)
D DIMER PPP FEU-MCNC: 412 NG/ML FEU
EGFRCR SERPLBLD CKD-EPI 2021: >90 ML/MIN/1.73M*2
EOSINOPHIL # BLD AUTO: 0.31 X10*3/UL (ref 0–0.7)
EOSINOPHIL NFR BLD AUTO: 2.4 %
ERYTHROCYTE [DISTWIDTH] IN BLOOD BY AUTOMATED COUNT: 14.9 % (ref 11.5–14.5)
GLUCOSE SERPL-MCNC: 119 MG/DL (ref 74–99)
HCT VFR BLD AUTO: 41 % (ref 36–46)
HGB BLD-MCNC: 13.1 G/DL (ref 12–16)
IMM GRANULOCYTES # BLD AUTO: 0.08 X10*3/UL (ref 0–0.7)
IMM GRANULOCYTES NFR BLD AUTO: 0.6 % (ref 0–0.9)
LYMPHOCYTES # BLD AUTO: 3.39 X10*3/UL (ref 1.2–4.8)
LYMPHOCYTES NFR BLD AUTO: 25.9 %
MCH RBC QN AUTO: 24.3 PG (ref 26–34)
MCHC RBC AUTO-ENTMCNC: 32 G/DL (ref 32–36)
MCV RBC AUTO: 76 FL (ref 80–100)
MONOCYTES # BLD AUTO: 0.47 X10*3/UL (ref 0.1–1)
MONOCYTES NFR BLD AUTO: 3.6 %
NEUTROPHILS # BLD AUTO: 8.78 X10*3/UL (ref 1.2–7.7)
NEUTROPHILS NFR BLD AUTO: 67 %
NRBC BLD-RTO: 0 /100 WBCS (ref 0–0)
PLATELET # BLD AUTO: 399 X10*3/UL (ref 150–450)
POTASSIUM SERPL-SCNC: 3.6 MMOL/L (ref 3.5–5.3)
PROT SERPL-MCNC: 7.2 G/DL (ref 6.4–8.2)
RBC # BLD AUTO: 5.38 X10*6/UL (ref 4–5.2)
SODIUM SERPL-SCNC: 140 MMOL/L (ref 136–145)
WBC # BLD AUTO: 13.1 X10*3/UL (ref 4.4–11.3)

## 2025-08-11 PROCEDURE — 2500000004 HC RX 250 GENERAL PHARMACY W/ HCPCS (ALT 636 FOR OP/ED): Mod: SE | Performed by: PHYSICIAN ASSISTANT

## 2025-08-11 PROCEDURE — 36415 COLL VENOUS BLD VENIPUNCTURE: CPT | Performed by: PHYSICIAN ASSISTANT

## 2025-08-11 PROCEDURE — 85025 COMPLETE CBC W/AUTO DIFF WBC: CPT | Performed by: PHYSICIAN ASSISTANT

## 2025-08-11 PROCEDURE — 80053 COMPREHEN METABOLIC PANEL: CPT | Performed by: PHYSICIAN ASSISTANT

## 2025-08-11 PROCEDURE — 85379 FIBRIN DEGRADATION QUANT: CPT | Performed by: PHYSICIAN ASSISTANT

## 2025-08-11 PROCEDURE — 96360 HYDRATION IV INFUSION INIT: CPT

## 2025-08-11 PROCEDURE — 99284 EMERGENCY DEPT VISIT MOD MDM: CPT | Mod: 25

## 2025-08-11 PROCEDURE — 93005 ELECTROCARDIOGRAM TRACING: CPT

## 2025-08-11 PROCEDURE — 84484 ASSAY OF TROPONIN QUANT: CPT | Performed by: PHYSICIAN ASSISTANT

## 2025-08-11 RX ADMIN — SODIUM CHLORIDE 1000 ML: 0.9 INJECTION, SOLUTION INTRAVENOUS at 19:41

## 2025-08-11 ASSESSMENT — PAIN SCALES - GENERAL
PAINLEVEL_OUTOF10: 0 - NO PAIN
PAINLEVEL_OUTOF10: 1

## 2025-08-11 ASSESSMENT — PAIN - FUNCTIONAL ASSESSMENT: PAIN_FUNCTIONAL_ASSESSMENT: 0-10

## 2025-08-11 ASSESSMENT — PAIN DESCRIPTION - DESCRIPTORS: DESCRIPTORS: SORE

## 2025-08-11 ASSESSMENT — PAIN DESCRIPTION - LOCATION: LOCATION: INCISION

## 2025-08-11 ASSESSMENT — PAIN DESCRIPTION - PAIN TYPE: TYPE: ACUTE PAIN

## 2025-08-12 ENCOUNTER — EVALUATION (OUTPATIENT)
Dept: OCCUPATIONAL THERAPY | Facility: HOSPITAL | Age: 30
End: 2025-08-12
Payer: COMMERCIAL

## 2025-08-12 DIAGNOSIS — M25.532 PAIN IN BOTH WRISTS: Primary | ICD-10-CM

## 2025-08-12 DIAGNOSIS — M25.531 PAIN IN BOTH WRISTS: Primary | ICD-10-CM

## 2025-08-12 LAB
ATRIAL RATE: 109 BPM
P AXIS: 40 DEGREES
P OFFSET: 207 MS
P ONSET: 150 MS
PR INTERVAL: 130 MS
Q ONSET: 215 MS
QRS COUNT: 18 BEATS
QRS DURATION: 90 MS
QT INTERVAL: 308 MS
QTC CALCULATION(BAZETT): 414 MS
QTC FREDERICIA: 375 MS
R AXIS: 38 DEGREES
T AXIS: -9 DEGREES
T OFFSET: 369 MS
VENTRICULAR RATE: 109 BPM

## 2025-08-12 PROCEDURE — 97165 OT EVAL LOW COMPLEX 30 MIN: CPT | Mod: GO | Performed by: OCCUPATIONAL THERAPIST

## 2025-08-12 PROCEDURE — 97110 THERAPEUTIC EXERCISES: CPT | Mod: GO | Performed by: OCCUPATIONAL THERAPIST

## 2025-08-19 ENCOUNTER — TREATMENT (OUTPATIENT)
Dept: OCCUPATIONAL THERAPY | Facility: HOSPITAL | Age: 30
End: 2025-08-19
Payer: COMMERCIAL

## 2025-08-19 DIAGNOSIS — M25.531 PAIN IN BOTH WRISTS: Primary | ICD-10-CM

## 2025-08-19 DIAGNOSIS — M25.532 PAIN IN BOTH WRISTS: Primary | ICD-10-CM

## 2025-08-19 PROCEDURE — 97110 THERAPEUTIC EXERCISES: CPT | Mod: GO | Performed by: OCCUPATIONAL THERAPIST

## 2025-08-26 ENCOUNTER — DOCUMENTATION (OUTPATIENT)
Dept: OCCUPATIONAL THERAPY | Facility: HOSPITAL | Age: 30
End: 2025-08-26
Payer: COMMERCIAL

## 2025-08-26 ENCOUNTER — APPOINTMENT (OUTPATIENT)
Dept: OCCUPATIONAL THERAPY | Facility: HOSPITAL | Age: 30
End: 2025-08-26
Payer: COMMERCIAL

## 2025-08-26 DIAGNOSIS — M25.531 PAIN IN BOTH WRISTS: Primary | ICD-10-CM

## 2025-08-26 DIAGNOSIS — M25.532 PAIN IN BOTH WRISTS: Primary | ICD-10-CM

## 2025-09-02 ENCOUNTER — DOCUMENTATION (OUTPATIENT)
Dept: OCCUPATIONAL THERAPY | Facility: HOSPITAL | Age: 30
End: 2025-09-02
Payer: COMMERCIAL

## 2025-09-02 DIAGNOSIS — M25.532 PAIN IN BOTH WRISTS: Primary | ICD-10-CM

## 2025-09-02 DIAGNOSIS — M25.531 PAIN IN BOTH WRISTS: Primary | ICD-10-CM

## 2025-09-04 ENCOUNTER — DOCUMENTATION (OUTPATIENT)
Dept: OCCUPATIONAL THERAPY | Facility: HOSPITAL | Age: 30
End: 2025-09-04
Payer: COMMERCIAL

## 2025-09-04 DIAGNOSIS — M25.532 PAIN IN BOTH WRISTS: Primary | ICD-10-CM

## 2025-09-04 DIAGNOSIS — M25.531 PAIN IN BOTH WRISTS: Primary | ICD-10-CM
